# Patient Record
Sex: FEMALE | Race: WHITE | NOT HISPANIC OR LATINO | Employment: FULL TIME | URBAN - METROPOLITAN AREA
[De-identification: names, ages, dates, MRNs, and addresses within clinical notes are randomized per-mention and may not be internally consistent; named-entity substitution may affect disease eponyms.]

---

## 2022-06-17 PROBLEM — G56.03 BILATERAL CARPAL TUNNEL SYNDROME: Status: ACTIVE | Noted: 2020-02-14

## 2022-06-17 PROBLEM — Z87.81 HISTORY OF VERTEBRAL FRACTURE: Status: ACTIVE | Noted: 2017-02-13

## 2022-06-17 PROBLEM — M50.30 DEGENERATIVE DISC DISEASE, CERVICAL: Status: ACTIVE | Noted: 2019-11-14

## 2022-06-17 PROBLEM — M48.02 FORAMINAL STENOSIS OF CERVICAL REGION: Status: ACTIVE | Noted: 2020-02-14

## 2022-06-23 PROCEDURE — 88305 TISSUE EXAM BY PATHOLOGIST: CPT | Performed by: PATHOLOGY

## 2022-06-24 ENCOUNTER — LAB REQUISITION (OUTPATIENT)
Dept: LAB | Facility: HOSPITAL | Age: 57
End: 2022-06-24
Payer: COMMERCIAL

## 2022-06-24 DIAGNOSIS — Z12.11 ENCOUNTER FOR SCREENING FOR MALIGNANT NEOPLASM OF COLON: ICD-10-CM

## 2022-07-14 PROBLEM — K52.9 COLITIS, ACUTE: Status: ACTIVE | Noted: 2022-07-14

## 2022-10-21 PROBLEM — K21.9 GERD (GASTROESOPHAGEAL REFLUX DISEASE): Status: ACTIVE | Noted: 2022-10-21

## 2023-06-01 ENCOUNTER — APPOINTMENT (OUTPATIENT)
Age: 58
End: 2023-06-01
Payer: OTHER MISCELLANEOUS

## 2023-06-01 DIAGNOSIS — S39.012A STRAIN OF LUMBAR REGION, INITIAL ENCOUNTER: Primary | ICD-10-CM

## 2023-06-01 DIAGNOSIS — S39.012A STRAIN OF LUMBAR REGION, INITIAL ENCOUNTER: ICD-10-CM

## 2023-06-01 PROCEDURE — G0382 LEV 3 HOSP TYPE B ED VISIT: HCPCS | Performed by: PHYSICIAN ASSISTANT

## 2023-06-01 PROCEDURE — 99283 EMERGENCY DEPT VISIT LOW MDM: CPT | Performed by: PHYSICIAN ASSISTANT

## 2023-06-01 PROCEDURE — 72100 X-RAY EXAM L-S SPINE 2/3 VWS: CPT

## 2023-06-08 ENCOUNTER — APPOINTMENT (OUTPATIENT)
Age: 58
End: 2023-06-08
Payer: OTHER MISCELLANEOUS

## 2023-06-08 PROCEDURE — G0382 LEV 3 HOSP TYPE B ED VISIT: HCPCS

## 2023-06-08 PROCEDURE — 99283 EMERGENCY DEPT VISIT LOW MDM: CPT

## 2023-06-21 ENCOUNTER — APPOINTMENT (OUTPATIENT)
Age: 58
End: 2023-06-21
Payer: OTHER MISCELLANEOUS

## 2023-06-21 PROCEDURE — 99213 OFFICE O/P EST LOW 20 MIN: CPT

## 2023-07-06 ENCOUNTER — APPOINTMENT (OUTPATIENT)
Age: 58
End: 2023-07-06
Payer: OTHER MISCELLANEOUS

## 2023-07-06 PROCEDURE — 99213 OFFICE O/P EST LOW 20 MIN: CPT | Performed by: PHYSICIAN ASSISTANT

## 2023-07-07 ENCOUNTER — TELEPHONE (OUTPATIENT)
Dept: PAIN MEDICINE | Facility: MEDICAL CENTER | Age: 58
End: 2023-07-07

## 2023-07-07 NOTE — TELEPHONE ENCOUNTER
Caller: Tha Ontiveros NP    Doctor: Dr Autumn Thapa    Reason for call: Verify w/c claim and update the personal insurance with the pt     Date of accident  05/24/23  Body Part  Lower back   Ins [facility]   Claim  # [id number]  Tam Jose [phone] ext [phone]  AS[address]  [id number]  [place]  [id number]      Call back#: [phone] to update personal in

## 2023-07-11 NOTE — TELEPHONE ENCOUNTER
Left message with W/C Adjustor listed below to verify if claim is open/active for back pain. Attempted to reach patient to verify if she has secondary insurance, however only received busy tone when dialing.

## 2023-07-11 NOTE — TELEPHONE ENCOUNTER
Caller: Genny Tavera PT    Doctor: Dr Theodora Madera     Reason for call: Pt called in provide the secondary insurance and the chart has been updated     Call back#: N/A

## 2023-07-28 NOTE — TELEPHONE ENCOUNTER
Spoke with Casey Melvin. Explained to  that patient is currently established with [facility] for same issue, if she wishes to establish with [facility] she must leave their practice and have records faxed over.  stated that she can see/continue care at [address] [place] [address], it is not required for her to be seen at [facility] through [address] [place] [address] Drive will be reaching out to patient with this information.

## 2023-10-18 PROBLEM — R19.7 DIARRHEA: Status: ACTIVE | Noted: 2023-10-18

## 2024-02-12 PROCEDURE — 88305 TISSUE EXAM BY PATHOLOGIST: CPT | Performed by: STUDENT IN AN ORGANIZED HEALTH CARE EDUCATION/TRAINING PROGRAM

## 2024-02-13 ENCOUNTER — LAB REQUISITION (OUTPATIENT)
Dept: LAB | Facility: HOSPITAL | Age: 59
End: 2024-02-13
Payer: COMMERCIAL

## 2024-02-13 DIAGNOSIS — K52.89 OTHER SPECIFIED NONINFECTIVE GASTROENTERITIS AND COLITIS: ICD-10-CM

## 2024-02-27 PROBLEM — K50.10 CROHN'S COLITIS (HCC): Status: ACTIVE | Noted: 2022-07-14

## 2025-02-02 ENCOUNTER — OFFICE VISIT (OUTPATIENT)
Dept: URGENT CARE | Facility: MEDICAL CENTER | Age: 60
End: 2025-02-02
Payer: COMMERCIAL

## 2025-02-02 VITALS
SYSTOLIC BLOOD PRESSURE: 136 MMHG | OXYGEN SATURATION: 98 % | RESPIRATION RATE: 18 BRPM | TEMPERATURE: 97.4 F | HEART RATE: 115 BPM | DIASTOLIC BLOOD PRESSURE: 74 MMHG

## 2025-02-02 DIAGNOSIS — N61.0 CELLULITIS OF LEFT BREAST: Primary | ICD-10-CM

## 2025-02-02 PROCEDURE — G0382 LEV 3 HOSP TYPE B ED VISIT: HCPCS | Performed by: PHYSICIAN ASSISTANT

## 2025-02-02 RX ORDER — LOPERAMIDE HYDROCHLORIDE 2 MG/1
2 TABLET ORAL
COMMUNITY

## 2025-02-02 RX ORDER — CLONIDINE HYDROCHLORIDE 0.1 MG/1
0.05 TABLET ORAL 2 TIMES DAILY
COMMUNITY
Start: 2024-12-27

## 2025-02-02 RX ORDER — IBUPROFEN 600 MG/1
TABLET, FILM COATED ORAL
COMMUNITY

## 2025-02-02 RX ORDER — MONTELUKAST SODIUM 10 MG/1
10 TABLET ORAL
COMMUNITY
Start: 2025-01-17

## 2025-02-02 RX ORDER — LAMOTRIGINE 100 MG/1
1 TABLET ORAL DAILY
COMMUNITY
Start: 2024-12-10

## 2025-02-02 RX ORDER — FLUCONAZOLE 150 MG/1
TABLET ORAL
COMMUNITY
Start: 2024-12-16

## 2025-02-02 RX ORDER — DOXYCYCLINE 100 MG/1
100 TABLET ORAL 2 TIMES DAILY
Qty: 20 TABLET | Refills: 0 | Status: SHIPPED | OUTPATIENT
Start: 2025-02-02 | End: 2025-02-12

## 2025-02-02 RX ORDER — TRIAMCINOLONE ACETONIDE 1 MG/G
1 CREAM TOPICAL 2 TIMES DAILY
COMMUNITY
Start: 2024-12-24

## 2025-02-02 RX ORDER — HYDROXYZINE HYDROCHLORIDE 10 MG/1
TABLET, FILM COATED ORAL
COMMUNITY
Start: 2025-01-24

## 2025-02-02 RX ORDER — LORAZEPAM 0.5 MG/1
TABLET ORAL
COMMUNITY

## 2025-02-02 RX ORDER — BUDESONIDE 90 UG/1
2 AEROSOL, POWDER RESPIRATORY (INHALATION) 2 TIMES DAILY
COMMUNITY
Start: 2024-12-13

## 2025-02-03 NOTE — PROGRESS NOTES
[facility]        NAME: Brisa Nick is a 59 y.o. female  : 1965    MRN: [id number]  DATE: 2025  TIME: 7:37 PM    Assessment and Plan   Cellulitis of left breast [N61.0]  1. Cellulitis of left breast  doxycycline (ADOXA) 100 MG tablet            Patient Instructions       Follow up with PCP in 7 to 10 days.  Or sooner if there is a problem.  Finish antibiotic.  Do not squeeze the area.  Soap and water.  Explained to the patient that there is no way to culture the area as there is no drainage.    If tests have been performed at [facility], our office will contact you with results if changes need to be made to the care plan discussed with you at the visit.  [facility]    Chief Complaint     Chief Complaint   Patient presents with    Breast Mass     Patient c/o a lump on her left breat x 3 days          History of Present Illness       Patient over the last 3 days has had a area on her left breast at 5:00 below the nipple that is erythematous and slightly scabbed over.  She states that it has gotten bigger since she squeezed it and got some liquid out of it.  There has been no drainage since then but the redness has increased.  No fever or chills.  Patient feels she has MRSA even though there is no prior history of this.        Review of Systems   Review of Systems   All other systems reviewed and are negative.        Current Medications       Current Outpatient Medications:     cloNIDine (CATAPRES) 0.1 mg tablet, Take 0.05 mg by mouth 2 (two) times a day, Disp: , Rfl:     doxycycline (ADOXA) 100 MG tablet, Take 1 tablet (100 mg total) by mouth 2 (two) times a day for 10 days, Disp: 20 tablet, Rfl: 0    fluconazole (DIFLUCAN) 150 mg tablet, TAKE 1 TABLET BY MOUTH NOW AND REPEAT DOSE IN 3 DAYS, Disp: , Rfl:     hydrOXYzine HCL (ATARAX) 10 mg tablet, TAKE BY MOUTH UP TO 2 TABLETS NIGHTLY AS NEEDED FOR SLEEP, Disp: , Rfl:     lamoTRIgine (LaMICtal)  100 mg tablet, Take 1 tablet by mouth in the morning, Disp: , Rfl:     montelukast (SINGULAIR) 10 mg tablet, Take 10 mg by mouth, Disp: , Rfl:     NALOXONE HCL NA, 4 mg into each nostril, Disp: , Rfl:     Pulmicort Flexhaler 90 MCG/ACT inhaler, Inhale 2 puffs 2 (two) times a day, Disp: , Rfl:     triamcinolone (KENALOG) 0.1 % cream, Apply 1 application. topically 2 (two) times a day, Disp: , Rfl:     albuterol (PROVENTIL HFA,VENTOLIN HFA) 90 mcg/act inhaler, Inhale 2 puffs if needed, Disp: , Rfl:     ARIPiprazole (ABILIFY) 5 mg tablet, Take 5 mg by mouth in the morning (Patient not taking: Reported on 1/23/2025), Disp: , Rfl:     buprenorphine (BUTRANS) 15 mcg/hr, Place 1 patch on the skin Once a week, Disp: , Rfl:     cholecalciferol (VITAMIN D3) 1,000 units tablet, Take 2,000 Units by mouth daily, Disp: , Rfl:     dicyclomine (BENTYL) 20 mg tablet, Take 20 mg by mouth 3 (three) times a day (Patient not taking: Reported on 1/23/2025), Disp: , Rfl:     diphenoxylate-atropine (LOMOTIL) 2.5-0.025 mg per tablet, Take 1 tablet by mouth 4 (four) times a day as needed for diarrhea, Disp: 100 tablet, Rfl: 2    HYDROcodone-acetaminophen (NORCO) 5-325 mg per tablet, Take 1-2 tablets by mouth every 8 (eight) hours as needed, Disp: , Rfl:     hyoscyamine (LEVSIN/SL) 0.125 mg SL tablet, Take 1 tablet (0.125 mg total) by mouth every 6 (six) hours as needed for cramping or diarrhea, Disp: 60 tablet, Rfl: 2    ibuprofen (MOTRIN) 600 mg tablet, Take by mouth, Disp: , Rfl:     lamoTRIgine (LaMICtal) 50 MG TBDP, Take 50 mg by mouth 2 (two) times a day, Disp: , Rfl:     loperamide (IMODIUM A-D) 2 MG tablet, Take 2 mg by mouth, Disp: , Rfl:     LORazepam (Ativan) 0.5 mg tablet, , Disp: , Rfl:     NON FORMULARY, Formulation: Dispensary Name: Dispensary Phone Number:, Disp: , Rfl:     omeprazole (PriLOSEC) 40 MG capsule, Take 40 mg by mouth daily, Disp: , Rfl:     Potassium 99 MG TABS, Take by mouth daily, Disp: , Rfl:     sodium  picosulfate, magnesium oxide, citric acid (Clenpiq) oral solution, Take 175 mL by mouth see administration instructions Follow instructions given at office, Disp: 350 mL, Rfl: 0    Upadacitinib ER (Rinvoq) 15 MG TB24, Take 1 tablet by mouth in the morning (Patient not taking: Reported on 1/23/2025), Disp: 90 tablet, Rfl: 3    Current Allergies     Allergies as of 02/02/2025 - Reviewed 01/23/2025   Allergen Reaction Noted    Gabapentin Other (See Comments) 02/22/2017    Amoxicillin Hives 01/09/2025    Banana - food allergy Other (See Comments) 02/02/2025    Banana extract allergy skin test - food allergy Other (See Comments) 02/02/2025    Cefadroxil Other (See Comments) 06/20/2022    Cephalexin Other (See Comments) 03/16/2015    Cephalosporins Other (See Comments) 02/28/2003    Charentais melon (Bulgarian melon) Other (See Comments) 02/02/2025    Latex Hives and Itching 02/27/2024    Lithium Other (See Comments) 06/20/2022    Misoprostol Other (See Comments) and Diarrhea 06/07/2018    Nuts - food allergy Other (See Comments) 02/02/2025    Other Other (See Comments) 07/28/2011    Quetiapine Other (See Comments) 03/16/2015    Sulfasalazine Other (See Comments) 03/12/2024    Silver Rash 02/27/2024    Wound dressing adhesive Rash 02/27/2024            The following portions of the patient's history were reviewed and updated as appropriate: allergies, current medications, past family history, past medical history, past social history, past surgical history and problem list.     Past Medical History:   Diagnosis Date    Bilateral carpal tunnel syndrome 2/14/2020    Degenerative disc disease, cervical 11/14/2019    Fibromyalgia 2/9/2012    Last Assessment & Plan:  Formatting of this note might be different from the original. 1.  As we discussed, given that your fibromyalgia is so symptomatic, treatment with a medication is reasonable in addition to the usual modalities of regular exercise, stretching and sleep hygiene.  The 3  medications approved for fibromyalgia or duloxetine, Lyrica and Savella.  My choice would be duloxetine.  Yo    Foraminal stenosis of cervical region 2/14/2020    History of vertebral fracture 2/13/2017    Migraine headache 5/2/2015    Last Assessment & Plan:  Formatting of this note might be different from the original. 1.  To reduce frequency of migraines, please follow a regular daily schedule going to bed and awakening at the same time.  Do not skip meals.  Maintain good hydration, preferably with water, throughout the day.  Avoid those foods/beverages/situations which you know can bring on migraines. 2.  Take turmeric (appr    Mild intermittent asthma without complication 2/9/2012    Formatting of this note might be different from the original. intermittent  Last Assessment & Plan:  Formatting of this note might be different from the original. 1.  Your lung exam is entirely clear during this visit.  You informs me that you had used your nebulizer before coming to the office. 2.  Continue using the nebulizer with the albuterol solution up to 4 times a day as needed. 3.  It goes    Mood disorder (HCC) 10/5/2015    Posttraumatic stress disorder 5/5/2014    Raynaud's phenomenon 9/18/2014    Vitamin D deficiency 4/18/2014    Last Assessment & Plan:  Formatting of this note might be different from the original. 1.  It is not clear why you are having a low level of vitamin D deficiency despite taking a supplement.  One possibility is an intestinal disorder such as celiac disease. 2.  Continue with the higher dose of vitamin D 5000 units daily for at least one month.  One month from now, stop the biotin for at least 1 we       Past Surgical History:   Procedure Laterality Date    COLONOSCOPY      Age 48 for screening, normal per patient. Details unknown.    COLONOSCOPY W/ BIOPSIES  06/2022    dr crawford; mild colitis etiology unclear.    COLONOSCOPY W/ BIOPSIES  02/2024    dr crawford; patch colitis with small ulcers;  diverticulosis    [facility]; normal stomach; normal duodenum; 3cm hiatal hernia.       Family History   Problem Relation Age of Onset    Kidney cancer Sister     Colon polyps Sister     Colon cancer Paternal Grandmother     Colon cancer Paternal Aunt     Colon cancer Paternal Uncle          Medications have been verified.        Objective   /74   Pulse (!) 115   Temp (!) 97.4 °F (36.3 °C)   Resp 18   SpO2 98%   No LMP recorded.       Physical Exam     Physical Exam  Vitals and nursing note reviewed.   Constitutional:       Appearance: Normal appearance. She is normal weight.   Cardiovascular:      Rate and Rhythm: Regular rhythm. Tachycardia present.      Pulses: Normal pulses.      Heart sounds: Normal heart sounds.   Pulmonary:      Effort: Pulmonary effort is normal.      Breath sounds: Normal breath sounds.   Neurological:      Mental Status: She is alert.

## 2025-02-04 ENCOUNTER — APPOINTMENT (OUTPATIENT)
Age: 60
End: 2025-02-04
Payer: COMMERCIAL

## 2025-02-07 ENCOUNTER — APPOINTMENT (OUTPATIENT)
Age: 60
End: 2025-02-07
Payer: COMMERCIAL

## 2025-02-07 ENCOUNTER — APPOINTMENT (OUTPATIENT)
Dept: LAB | Facility: CLINIC | Age: 60
End: 2025-02-07
Payer: COMMERCIAL

## 2025-02-07 DIAGNOSIS — R19.7 DIARRHEA, UNSPECIFIED TYPE: ICD-10-CM

## 2025-02-07 DIAGNOSIS — E78.1 HYPERTRIGLYCERIDEMIA: ICD-10-CM

## 2025-02-07 DIAGNOSIS — K50.119 CROHN'S DISEASE OF COLON WITH COMPLICATION (HCC): ICD-10-CM

## 2025-02-07 DIAGNOSIS — K50.919 CROHN'S DISEASE WITH COMPLICATION, UNSPECIFIED GASTROINTESTINAL TRACT LOCATION (HCC): ICD-10-CM

## 2025-02-07 LAB
ALBUMIN SERPL BCG-MCNC: 4.4 G/DL (ref 3.5–5)
ALP SERPL-CCNC: 60 U/L (ref 34–104)
ALT SERPL W P-5'-P-CCNC: 14 U/L (ref 7–52)
ANION GAP SERPL CALCULATED.3IONS-SCNC: 10 MMOL/L (ref 4–13)
AST SERPL W P-5'-P-CCNC: 14 U/L (ref 13–39)
BASOPHILS # BLD AUTO: 0.06 THOUSANDS/ΜL (ref 0–0.1)
BASOPHILS NFR BLD AUTO: 1 % (ref 0–1)
BILIRUB SERPL-MCNC: 0.51 MG/DL (ref 0.2–1)
BUN SERPL-MCNC: 19 MG/DL (ref 5–25)
CALCIUM SERPL-MCNC: 9.5 MG/DL (ref 8.4–10.2)
CHLORIDE SERPL-SCNC: 100 MMOL/L (ref 96–108)
CHOLEST SERPL-MCNC: 194 MG/DL (ref ?–200)
CO2 SERPL-SCNC: 26 MMOL/L (ref 21–32)
CREAT SERPL-MCNC: 0.67 MG/DL (ref 0.6–1.3)
CRP SERPL QL: 16.8 MG/L
EOSINOPHIL # BLD AUTO: 0.28 THOUSAND/ΜL (ref 0–0.61)
EOSINOPHIL NFR BLD AUTO: 3 % (ref 0–6)
ERYTHROCYTE [DISTWIDTH] IN BLOOD BY AUTOMATED COUNT: 12.8 % (ref 11.6–15.1)
ERYTHROCYTE [SEDIMENTATION RATE] IN BLOOD: 40 MM/HOUR (ref 0–29)
GFR SERPL CREATININE-BSD FRML MDRD: 95 ML/MIN/1.73SQ M
GLUCOSE P FAST SERPL-MCNC: 106 MG/DL (ref 65–99)
HCT VFR BLD AUTO: 44 % (ref 34.8–46.1)
HDLC SERPL-MCNC: 45 MG/DL
HGB BLD-MCNC: 14.5 G/DL (ref 11.5–15.4)
IMM GRANULOCYTES # BLD AUTO: 0.07 THOUSAND/UL (ref 0–0.2)
IMM GRANULOCYTES NFR BLD AUTO: 1 % (ref 0–2)
LDLC SERPL CALC-MCNC: 127 MG/DL (ref 0–100)
LYMPHOCYTES # BLD AUTO: 2.16 THOUSANDS/ΜL (ref 0.6–4.47)
LYMPHOCYTES NFR BLD AUTO: 19 % (ref 14–44)
MCH RBC QN AUTO: 29.3 PG (ref 26.8–34.3)
MCHC RBC AUTO-ENTMCNC: 33 G/DL (ref 31.4–37.4)
MCV RBC AUTO: 89 FL (ref 82–98)
MONOCYTES # BLD AUTO: 0.69 THOUSAND/ΜL (ref 0.17–1.22)
MONOCYTES NFR BLD AUTO: 6 % (ref 4–12)
NEUTROPHILS # BLD AUTO: 7.94 THOUSANDS/ΜL (ref 1.85–7.62)
NEUTS SEG NFR BLD AUTO: 70 % (ref 43–75)
NONHDLC SERPL-MCNC: 149 MG/DL
NRBC BLD AUTO-RTO: 0 /100 WBCS
PLATELET # BLD AUTO: 332 THOUSANDS/UL (ref 149–390)
PMV BLD AUTO: 10.5 FL (ref 8.9–12.7)
POTASSIUM SERPL-SCNC: 4.1 MMOL/L (ref 3.5–5.3)
PROT SERPL-MCNC: 6.9 G/DL (ref 6.4–8.4)
RBC # BLD AUTO: 4.95 MILLION/UL (ref 3.81–5.12)
SODIUM SERPL-SCNC: 136 MMOL/L (ref 135–147)
TRIGL SERPL-MCNC: 112 MG/DL (ref ?–150)
WBC # BLD AUTO: 11.2 THOUSAND/UL (ref 4.31–10.16)

## 2025-02-07 PROCEDURE — 80053 COMPREHEN METABOLIC PANEL: CPT

## 2025-02-07 PROCEDURE — 86706 HEP B SURFACE ANTIBODY: CPT

## 2025-02-07 PROCEDURE — 86480 TB TEST CELL IMMUN MEASURE: CPT

## 2025-02-07 PROCEDURE — 85025 COMPLETE CBC W/AUTO DIFF WBC: CPT

## 2025-02-07 PROCEDURE — 36415 COLL VENOUS BLD VENIPUNCTURE: CPT

## 2025-02-07 PROCEDURE — 83993 ASSAY FOR CALPROTECTIN FECAL: CPT

## 2025-02-07 PROCEDURE — 87340 HEPATITIS B SURFACE AG IA: CPT

## 2025-02-07 PROCEDURE — 86140 C-REACTIVE PROTEIN: CPT

## 2025-02-07 PROCEDURE — 85652 RBC SED RATE AUTOMATED: CPT

## 2025-02-07 PROCEDURE — 80061 LIPID PANEL: CPT

## 2025-02-08 LAB
GAMMA INTERFERON BACKGROUND BLD IA-ACNC: <0 IU/ML
HBV SURFACE AB SER-ACNC: 7.15 MIU/ML
HBV SURFACE AG SER QL: NORMAL
M TB IFN-G BLD-IMP: NEGATIVE
M TB IFN-G CD4+ BCKGRND COR BLD-ACNC: 0 IU/ML
M TB IFN-G CD4+ BCKGRND COR BLD-ACNC: 0 IU/ML
MITOGEN IGNF BCKGRD COR BLD-ACNC: 10 IU/ML

## 2025-02-10 LAB — CALPROTECTIN STL-MCNC: 198 ΜG/G

## 2025-02-14 PROCEDURE — 88305 TISSUE EXAM BY PATHOLOGIST: CPT | Performed by: PATHOLOGY

## 2025-02-17 ENCOUNTER — LAB REQUISITION (OUTPATIENT)
Dept: LAB | Facility: HOSPITAL | Age: 60
End: 2025-02-17
Payer: COMMERCIAL

## 2025-02-17 DIAGNOSIS — K50.90 CROHN'S DISEASE, UNSPECIFIED, WITHOUT COMPLICATIONS (HCC): ICD-10-CM

## 2025-02-17 DIAGNOSIS — K52.89 OTHER SPECIFIED NONINFECTIVE GASTROENTERITIS AND COLITIS: ICD-10-CM

## 2025-03-02 ENCOUNTER — HOSPITAL ENCOUNTER (EMERGENCY)
Facility: HOSPITAL | Age: 60
End: 2025-03-03
Attending: EMERGENCY MEDICINE
Payer: COMMERCIAL

## 2025-03-02 DIAGNOSIS — K50.119 CROHN'S DISEASE OF COLON WITH COMPLICATION (HCC): ICD-10-CM

## 2025-03-02 DIAGNOSIS — F31.9 BIPOLAR DISORDER (HCC): ICD-10-CM

## 2025-03-02 DIAGNOSIS — Z00.8 ENCOUNTER FOR PSYCHOLOGICAL EVALUATION: Primary | ICD-10-CM

## 2025-03-02 LAB
ALBUMIN SERPL BCG-MCNC: 4.9 G/DL (ref 3.5–5)
ALP SERPL-CCNC: 63 U/L (ref 34–104)
ALT SERPL W P-5'-P-CCNC: 20 U/L (ref 7–52)
AMPHETAMINES SERPL QL SCN: NEGATIVE
ANION GAP SERPL CALCULATED.3IONS-SCNC: 6 MMOL/L (ref 4–13)
APAP SERPL-MCNC: <2 UG/ML (ref 10–20)
AST SERPL W P-5'-P-CCNC: 20 U/L (ref 13–39)
ATRIAL RATE: 88 BPM
BACTERIA UR QL AUTO: ABNORMAL /HPF
BARBITURATES UR QL: NEGATIVE
BASOPHILS # BLD AUTO: 0.05 THOUSANDS/ÂΜL (ref 0–0.1)
BASOPHILS NFR BLD AUTO: 0 % (ref 0–1)
BENZODIAZ UR QL: NEGATIVE
BILIRUB SERPL-MCNC: 0.44 MG/DL (ref 0.2–1)
BILIRUB UR QL STRIP: NEGATIVE
BUN SERPL-MCNC: 15 MG/DL (ref 5–25)
CALCIUM SERPL-MCNC: 9.7 MG/DL (ref 8.4–10.2)
CHLORIDE SERPL-SCNC: 105 MMOL/L (ref 96–108)
CLARITY UR: CLEAR
CO2 SERPL-SCNC: 30 MMOL/L (ref 21–32)
COCAINE UR QL: NEGATIVE
COLOR UR: ABNORMAL
CREAT SERPL-MCNC: 0.66 MG/DL (ref 0.6–1.3)
EOSINOPHIL # BLD AUTO: 0.04 THOUSAND/ÂΜL (ref 0–0.61)
EOSINOPHIL NFR BLD AUTO: 0 % (ref 0–6)
ERYTHROCYTE [DISTWIDTH] IN BLOOD BY AUTOMATED COUNT: 12.9 % (ref 11.6–15.1)
ETHANOL SERPL-MCNC: <10 MG/DL
FENTANYL UR QL SCN: NEGATIVE
GFR SERPL CREATININE-BSD FRML MDRD: 95 ML/MIN/1.73SQ M
GLUCOSE SERPL-MCNC: 114 MG/DL (ref 65–140)
GLUCOSE UR STRIP-MCNC: NEGATIVE MG/DL
HCT VFR BLD AUTO: 45.7 % (ref 34.8–46.1)
HGB BLD-MCNC: 15.2 G/DL (ref 11.5–15.4)
HGB UR QL STRIP.AUTO: ABNORMAL
HYDROCODONE UR QL SCN: NEGATIVE
IMM GRANULOCYTES # BLD AUTO: 0.05 THOUSAND/UL (ref 0–0.2)
IMM GRANULOCYTES NFR BLD AUTO: 0 % (ref 0–2)
KETONES UR STRIP-MCNC: NEGATIVE MG/DL
LEUKOCYTE ESTERASE UR QL STRIP: NEGATIVE
LYMPHOCYTES # BLD AUTO: 1.79 THOUSANDS/ÂΜL (ref 0.6–4.47)
LYMPHOCYTES NFR BLD AUTO: 15 % (ref 14–44)
MCH RBC QN AUTO: 28.7 PG (ref 26.8–34.3)
MCHC RBC AUTO-ENTMCNC: 33.3 G/DL (ref 31.4–37.4)
MCV RBC AUTO: 86 FL (ref 82–98)
METHADONE UR QL: NEGATIVE
MONOCYTES # BLD AUTO: 0.66 THOUSAND/ÂΜL (ref 0.17–1.22)
MONOCYTES NFR BLD AUTO: 6 % (ref 4–12)
MUCOUS THREADS UR QL AUTO: ABNORMAL
NEUTROPHILS # BLD AUTO: 9.47 THOUSANDS/ÂΜL (ref 1.85–7.62)
NEUTS SEG NFR BLD AUTO: 79 % (ref 43–75)
NITRITE UR QL STRIP: NEGATIVE
NON-SQ EPI CELLS URNS QL MICRO: ABNORMAL /HPF
NRBC BLD AUTO-RTO: 0 /100 WBCS
OPIATES UR QL SCN: NEGATIVE
OXYCODONE+OXYMORPHONE UR QL SCN: NEGATIVE
P AXIS: 73 DEGREES
PCP UR QL: NEGATIVE
PH UR STRIP.AUTO: 5.5 [PH]
PLATELET # BLD AUTO: 325 THOUSANDS/UL (ref 149–390)
PMV BLD AUTO: 9.9 FL (ref 8.9–12.7)
POTASSIUM SERPL-SCNC: 4.1 MMOL/L (ref 3.5–5.3)
PR INTERVAL: 142 MS
PROT SERPL-MCNC: 7.2 G/DL (ref 6.4–8.4)
PROT UR STRIP-MCNC: NEGATIVE MG/DL
QRS AXIS: 72 DEGREES
QRSD INTERVAL: 80 MS
QT INTERVAL: 360 MS
QTC INTERVAL: 435 MS
RBC # BLD AUTO: 5.29 MILLION/UL (ref 3.81–5.12)
RBC #/AREA URNS AUTO: ABNORMAL /HPF
SALICYLATES SERPL-MCNC: <5 MG/DL (ref 3–20)
SODIUM SERPL-SCNC: 141 MMOL/L (ref 135–147)
SP GR UR STRIP.AUTO: 1.01 (ref 1–1.03)
T WAVE AXIS: 77 DEGREES
T4 FREE SERPL-MCNC: 0.97 NG/DL (ref 0.61–1.12)
THC UR QL: NEGATIVE
TSH SERPL DL<=0.05 MIU/L-ACNC: 0.33 UIU/ML (ref 0.45–4.5)
UROBILINOGEN UR STRIP-ACNC: <2 MG/DL
VENTRICULAR RATE: 88 BPM
WBC # BLD AUTO: 12.06 THOUSAND/UL (ref 4.31–10.16)
WBC #/AREA URNS AUTO: ABNORMAL /HPF

## 2025-03-02 PROCEDURE — 80307 DRUG TEST PRSMV CHEM ANLYZR: CPT | Performed by: PHYSICIAN ASSISTANT

## 2025-03-02 PROCEDURE — 93005 ELECTROCARDIOGRAM TRACING: CPT

## 2025-03-02 PROCEDURE — 99285 EMERGENCY DEPT VISIT HI MDM: CPT | Performed by: PHYSICIAN ASSISTANT

## 2025-03-02 PROCEDURE — 82077 ASSAY SPEC XCP UR&BREATH IA: CPT | Performed by: PHYSICIAN ASSISTANT

## 2025-03-02 PROCEDURE — 84439 ASSAY OF FREE THYROXINE: CPT | Performed by: PHYSICIAN ASSISTANT

## 2025-03-02 PROCEDURE — 85025 COMPLETE CBC W/AUTO DIFF WBC: CPT | Performed by: PHYSICIAN ASSISTANT

## 2025-03-02 PROCEDURE — 80053 COMPREHEN METABOLIC PANEL: CPT | Performed by: PHYSICIAN ASSISTANT

## 2025-03-02 PROCEDURE — 80179 DRUG ASSAY SALICYLATE: CPT | Performed by: PHYSICIAN ASSISTANT

## 2025-03-02 PROCEDURE — 84443 ASSAY THYROID STIM HORMONE: CPT | Performed by: PHYSICIAN ASSISTANT

## 2025-03-02 PROCEDURE — 93010 ELECTROCARDIOGRAM REPORT: CPT | Performed by: INTERNAL MEDICINE

## 2025-03-02 PROCEDURE — 36415 COLL VENOUS BLD VENIPUNCTURE: CPT | Performed by: PHYSICIAN ASSISTANT

## 2025-03-02 PROCEDURE — 80143 DRUG ASSAY ACETAMINOPHEN: CPT | Performed by: PHYSICIAN ASSISTANT

## 2025-03-02 PROCEDURE — 99284 EMERGENCY DEPT VISIT MOD MDM: CPT

## 2025-03-02 PROCEDURE — 81001 URINALYSIS AUTO W/SCOPE: CPT | Performed by: PHYSICIAN ASSISTANT

## 2025-03-02 RX ORDER — LAMOTRIGINE 100 MG/1
100 TABLET ORAL DAILY
Status: CANCELLED | OUTPATIENT
Start: 2025-03-03

## 2025-03-02 RX ORDER — RISPERIDONE 1 MG/1
0.5 TABLET ORAL
Status: CANCELLED | OUTPATIENT
Start: 2025-03-02

## 2025-03-02 RX ORDER — ACETAMINOPHEN 325 MG/1
975 TABLET ORAL EVERY 6 HOURS PRN
Status: CANCELLED | OUTPATIENT
Start: 2025-03-02

## 2025-03-02 RX ORDER — LORAZEPAM 0.5 MG/1
0.5 TABLET ORAL ONCE
Status: COMPLETED | OUTPATIENT
Start: 2025-03-02 | End: 2025-03-02

## 2025-03-02 RX ORDER — HYDROXYZINE HYDROCHLORIDE 25 MG/1
25 TABLET, FILM COATED ORAL
Status: CANCELLED | OUTPATIENT
Start: 2025-03-02

## 2025-03-02 RX ORDER — ACETAMINOPHEN 325 MG/1
650 TABLET ORAL EVERY 4 HOURS PRN
Status: CANCELLED | OUTPATIENT
Start: 2025-03-02

## 2025-03-02 RX ORDER — HYDROXYZINE HYDROCHLORIDE 25 MG/1
50 TABLET, FILM COATED ORAL
Status: CANCELLED | OUTPATIENT
Start: 2025-03-02

## 2025-03-02 RX ORDER — RISPERIDONE 1 MG/1
1 TABLET ORAL
Status: CANCELLED | OUTPATIENT
Start: 2025-03-02

## 2025-03-02 RX ORDER — LAMOTRIGINE 25 MG/1
50 TABLET ORAL 2 TIMES DAILY
Status: CANCELLED | OUTPATIENT
Start: 2025-03-02

## 2025-03-02 RX ORDER — PROPRANOLOL HYDROCHLORIDE 10 MG/1
5 TABLET ORAL EVERY 8 HOURS PRN
Status: CANCELLED | OUTPATIENT
Start: 2025-03-02

## 2025-03-02 RX ORDER — TRAZODONE HYDROCHLORIDE 50 MG/1
50 TABLET ORAL
Status: CANCELLED | OUTPATIENT
Start: 2025-03-02

## 2025-03-02 RX ORDER — HALOPERIDOL 5 MG/ML
5 INJECTION INTRAMUSCULAR
Status: CANCELLED | OUTPATIENT
Start: 2025-03-02

## 2025-03-02 RX ORDER — RISPERIDONE 0.25 MG/1
0.25 TABLET ORAL
Status: CANCELLED | OUTPATIENT
Start: 2025-03-02

## 2025-03-02 RX ORDER — MAGNESIUM HYDROXIDE/ALUMINUM HYDROXICE/SIMETHICONE 120; 1200; 1200 MG/30ML; MG/30ML; MG/30ML
30 SUSPENSION ORAL EVERY 4 HOURS PRN
Status: CANCELLED | OUTPATIENT
Start: 2025-03-02

## 2025-03-02 RX ADMIN — LORAZEPAM 0.5 MG: 0.5 TABLET ORAL at 21:20
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
## 2025-03-02 NOTE — ED NOTES
Daughter called and is wanting to petition a 302. Meeta at  on her way to ED at this time.      Orin Hay RN  03/02/25 4063
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
## 2025-03-02 NOTE — ED NOTES
Patient's daughter called the Ed, states that she feels mother is not safe at home, and would like to petition a 302 (as patient refused to stay in the hospital)  Daughter Meeta, PH; [phone]  Contacted  Crisis to notify them of impending 302

## 2025-03-03 ENCOUNTER — HOSPITAL ENCOUNTER (INPATIENT)
Facility: HOSPITAL | Age: 60
LOS: 15 days | Discharge: HOME | DRG: 885 | End: 2025-03-18
Attending: PSYCHIATRY & NEUROLOGY | Admitting: PSYCHIATRY & NEUROLOGY
Payer: COMMERCIAL

## 2025-03-03 VITALS
OXYGEN SATURATION: 98 % | HEIGHT: 65 IN | TEMPERATURE: 98.3 F | HEART RATE: 86 BPM | BODY MASS INDEX: 30.62 KG/M2 | SYSTOLIC BLOOD PRESSURE: 127 MMHG | RESPIRATION RATE: 16 BRPM | DIASTOLIC BLOOD PRESSURE: 57 MMHG

## 2025-03-03 DIAGNOSIS — E53.8 VITAMIN B12 DEFICIENCY: ICD-10-CM

## 2025-03-03 DIAGNOSIS — K05.10 GINGIVITIS: ICD-10-CM

## 2025-03-03 DIAGNOSIS — R10.9 ABDOMINAL SPASMS: ICD-10-CM

## 2025-03-03 DIAGNOSIS — H04.123 DRY EYES: ICD-10-CM

## 2025-03-03 DIAGNOSIS — K21.9 GASTROESOPHAGEAL REFLUX DISEASE, UNSPECIFIED WHETHER ESOPHAGITIS PRESENT: ICD-10-CM

## 2025-03-03 DIAGNOSIS — K50.119 CROHN'S DISEASE OF COLON WITH COMPLICATION (HCC): ICD-10-CM

## 2025-03-03 DIAGNOSIS — F31.5 BIPOLAR AFFECTIVE DISORDER, DEPRESSED, SEVERE, WITH PSYCHOTIC BEHAVIOR (HCC): Primary | ICD-10-CM

## 2025-03-03 DIAGNOSIS — F17.200 NICOTINE ADDICTION: ICD-10-CM

## 2025-03-03 DIAGNOSIS — J45.909 ASTHMA: ICD-10-CM

## 2025-03-03 DIAGNOSIS — G89.29 CHRONIC PAIN: ICD-10-CM

## 2025-03-03 DIAGNOSIS — L85.3 DRY SKIN: ICD-10-CM

## 2025-03-03 DIAGNOSIS — E55.9 VITAMIN D DEFICIENCY: ICD-10-CM

## 2025-03-03 DIAGNOSIS — F10.10 ALCOHOL ABUSE: ICD-10-CM

## 2025-03-03 DIAGNOSIS — J30.9 ALLERGIC RHINITIS: ICD-10-CM

## 2025-03-03 DIAGNOSIS — K13.79 MOUTH SORES: ICD-10-CM

## 2025-03-03 DIAGNOSIS — L60.2 OVERGROWN TOENAILS: ICD-10-CM

## 2025-03-03 PROBLEM — F33.3 MDD (MAJOR DEPRESSIVE DISORDER), RECURRENT, SEVERE, WITH PSYCHOSIS (HCC): Status: ACTIVE | Noted: 2025-03-03

## 2025-03-03 PROCEDURE — 99223 1ST HOSP IP/OBS HIGH 75: CPT | Performed by: PSYCHIATRY & NEUROLOGY

## 2025-03-03 RX ORDER — HALOPERIDOL 5 MG/ML
5 INJECTION INTRAMUSCULAR
Status: DISCONTINUED | OUTPATIENT
Start: 2025-03-03 | End: 2025-03-18 | Stop reason: HOSPADM

## 2025-03-03 RX ORDER — CRANBERRY FRUIT EXTRACT 200 MG
600 CAPSULE ORAL DAILY
COMMUNITY
End: 2025-03-18

## 2025-03-03 RX ORDER — LAMOTRIGINE 25 MG/1
50 TABLET ORAL 2 TIMES DAILY
Status: DISCONTINUED | OUTPATIENT
Start: 2025-03-03 | End: 2025-03-06

## 2025-03-03 RX ORDER — LAMOTRIGINE 100 MG/1
100 TABLET ORAL DAILY
Status: DISCONTINUED | OUTPATIENT
Start: 2025-03-03 | End: 2025-03-03

## 2025-03-03 RX ORDER — LANOLIN ALCOHOL/MO/W.PET/CERES
100 CREAM (GRAM) TOPICAL DAILY
Status: DISCONTINUED | OUTPATIENT
Start: 2025-03-03 | End: 2025-03-18 | Stop reason: HOSPADM

## 2025-03-03 RX ORDER — ACETAMINOPHEN 325 MG/1
650 TABLET ORAL EVERY 4 HOURS PRN
Status: DISCONTINUED | OUTPATIENT
Start: 2025-03-03 | End: 2025-03-18 | Stop reason: HOSPADM

## 2025-03-03 RX ORDER — FOLIC ACID 1 MG/1
1 TABLET ORAL DAILY
Status: DISCONTINUED | OUTPATIENT
Start: 2025-03-03 | End: 2025-03-18 | Stop reason: HOSPADM

## 2025-03-03 RX ORDER — RISPERIDONE 1 MG/1
1 TABLET ORAL
Status: DISCONTINUED | OUTPATIENT
Start: 2025-03-03 | End: 2025-03-18 | Stop reason: HOSPADM

## 2025-03-03 RX ORDER — TRAZODONE HYDROCHLORIDE 50 MG/1
50 TABLET ORAL
Status: DISCONTINUED | OUTPATIENT
Start: 2025-03-03 | End: 2025-03-18 | Stop reason: HOSPADM

## 2025-03-03 RX ORDER — HYDROCODONE BITARTRATE AND ACETAMINOPHEN 5; 325 MG/1; MG/1
2 TABLET ORAL EVERY 6 HOURS PRN
COMMUNITY
End: 2025-03-18

## 2025-03-03 RX ORDER — RISPERIDONE 1 MG/1
1 TABLET ORAL
Status: DISCONTINUED | OUTPATIENT
Start: 2025-03-03 | End: 2025-03-03

## 2025-03-03 RX ORDER — HYDROXYZINE HYDROCHLORIDE 25 MG/1
25 TABLET, FILM COATED ORAL
Status: DISCONTINUED | OUTPATIENT
Start: 2025-03-03 | End: 2025-03-18 | Stop reason: HOSPADM

## 2025-03-03 RX ORDER — OLANZAPINE 5 MG/1
5 TABLET, ORALLY DISINTEGRATING ORAL 3 TIMES DAILY PRN
Status: DISCONTINUED | OUTPATIENT
Start: 2025-03-03 | End: 2025-03-18 | Stop reason: HOSPADM

## 2025-03-03 RX ORDER — CYCLOSPORINE 0.5 MG/ML
1 EMULSION OPHTHALMIC EVERY 12 HOURS
Status: ON HOLD | COMMUNITY
End: 2025-03-15

## 2025-03-03 RX ORDER — BUPRENORPHINE 15 UG/H
1 PATCH TRANSDERMAL
Status: ON HOLD | COMMUNITY
End: 2025-03-15

## 2025-03-03 RX ORDER — TRAZODONE HYDROCHLORIDE 50 MG/1
50 TABLET ORAL
Status: DISCONTINUED | OUTPATIENT
Start: 2025-03-03 | End: 2025-03-03

## 2025-03-03 RX ORDER — PROPRANOLOL HYDROCHLORIDE 10 MG/1
5 TABLET ORAL EVERY 8 HOURS PRN
Status: DISCONTINUED | OUTPATIENT
Start: 2025-03-03 | End: 2025-03-18 | Stop reason: HOSPADM

## 2025-03-03 RX ORDER — RISPERIDONE 0.5 MG/1
0.5 TABLET ORAL
Status: DISCONTINUED | OUTPATIENT
Start: 2025-03-03 | End: 2025-03-03

## 2025-03-03 RX ORDER — HYDROXYZINE HYDROCHLORIDE 25 MG/1
25 TABLET, FILM COATED ORAL ONCE
Status: COMPLETED | OUTPATIENT
Start: 2025-03-03 | End: 2025-03-03

## 2025-03-03 RX ORDER — LORAZEPAM 0.5 MG/1
0.5 TABLET ORAL EVERY 8 HOURS PRN
Status: DISCONTINUED | OUTPATIENT
Start: 2025-03-03 | End: 2025-03-18 | Stop reason: HOSPADM

## 2025-03-03 RX ORDER — MAGNESIUM HYDROXIDE/ALUMINUM HYDROXICE/SIMETHICONE 120; 1200; 1200 MG/30ML; MG/30ML; MG/30ML
30 SUSPENSION ORAL EVERY 4 HOURS PRN
Status: DISCONTINUED | OUTPATIENT
Start: 2025-03-03 | End: 2025-03-18 | Stop reason: HOSPADM

## 2025-03-03 RX ORDER — RISPERIDONE 0.5 MG/1
0.5 TABLET ORAL
Status: DISCONTINUED | OUTPATIENT
Start: 2025-03-03 | End: 2025-03-18 | Stop reason: HOSPADM

## 2025-03-03 RX ORDER — BUPRENORPHINE 15 UG/H
1 PATCH TRANSDERMAL
Status: DISCONTINUED | OUTPATIENT
Start: 2025-03-03 | End: 2025-03-18 | Stop reason: HOSPADM

## 2025-03-03 RX ORDER — ALBUTEROL SULFATE 90 UG/1
2 INHALANT RESPIRATORY (INHALATION) EVERY 4 HOURS PRN
Status: DISCONTINUED | OUTPATIENT
Start: 2025-03-03 | End: 2025-03-18 | Stop reason: HOSPADM

## 2025-03-03 RX ORDER — RISPERIDONE 1 MG/1
1 TABLET, ORALLY DISINTEGRATING ORAL
Status: DISCONTINUED | OUTPATIENT
Start: 2025-03-03 | End: 2025-03-04

## 2025-03-03 RX ORDER — NICOTINE 21 MG/24HR
1 PATCH, TRANSDERMAL 24 HOURS TRANSDERMAL DAILY
Status: DISCONTINUED | OUTPATIENT
Start: 2025-03-03 | End: 2025-03-14

## 2025-03-03 RX ORDER — HYDROXYZINE HYDROCHLORIDE 50 MG/1
50 TABLET, FILM COATED ORAL
Status: DISCONTINUED | OUTPATIENT
Start: 2025-03-03 | End: 2025-03-18 | Stop reason: HOSPADM

## 2025-03-03 RX ORDER — PYRITHIONE ZINC 1 G/ML
2 LOTION/SHAMPOO TOPICAL DAILY
COMMUNITY
End: 2025-03-18

## 2025-03-03 RX ORDER — ACETAMINOPHEN 325 MG/1
975 TABLET ORAL EVERY 6 HOURS PRN
Status: DISCONTINUED | OUTPATIENT
Start: 2025-03-03 | End: 2025-03-18 | Stop reason: HOSPADM

## 2025-03-03 RX ORDER — BACLOFEN 20 MG
500 TABLET ORAL DAILY
COMMUNITY
End: 2025-03-18

## 2025-03-03 RX ORDER — LORAZEPAM 0.5 MG/1
0.5 TABLET ORAL ONCE
Status: COMPLETED | OUTPATIENT
Start: 2025-03-03 | End: 2025-03-03

## 2025-03-03 RX ORDER — VITAMIN B COMPLEX
1 CAPSULE ORAL DAILY
COMMUNITY
End: 2025-03-18

## 2025-03-03 RX ORDER — MONTELUKAST SODIUM 10 MG/1
10 TABLET ORAL DAILY
Status: DISCONTINUED | OUTPATIENT
Start: 2025-03-03 | End: 2025-03-18 | Stop reason: HOSPADM

## 2025-03-03 RX ORDER — RISPERIDONE 0.25 MG/1
0.25 TABLET ORAL
Status: DISCONTINUED | OUTPATIENT
Start: 2025-03-03 | End: 2025-03-03

## 2025-03-03 RX ADMIN — RISPERIDONE 1 MG: 1 TABLET, ORALLY DISINTEGRATING ORAL at 21:21

## 2025-03-03 RX ADMIN — LORAZEPAM 0.5 MG: 0.5 TABLET ORAL at 08:45

## 2025-03-03 RX ADMIN — Medication 3 MG: at 21:21

## 2025-03-03 RX ADMIN — LAMOTRIGINE 50 MG: 25 TABLET ORAL at 18:01

## 2025-03-03 RX ADMIN — HYDROXYZINE HYDROCHLORIDE 25 MG: 25 TABLET, FILM COATED ORAL at 03:40

## 2025-03-03 NOTE — LETTER
[facility]  [address] [facility] [address]  [phone]  Dept: [phone]    March 18,2025      Patient: Brisa Nick   YOB: 1965   Date of Visit: 3/3/2025       To Whom it May Concern:    Brisa Nick is was under the professional care of [facility] . She was seen in the hospital from 3/3/2025 to 3/18/2025. She  Will be following up with her primary care physician for further assistance.      If you have any questions or concerns, please don't hesitate to call.         Sincerely,          Oliver Holguin

## 2025-03-03 NOTE — NURSING NOTE
Patient arrived to [facility] at 1055 from [facility] ED. Patient states she is here because she had distorted reality by telling people in her way something was making her sick. PTA medication list is RN complete and both medical and psychiatric doctors were notified as well as C-SSRS lifetime and daily risk is low. Patient has anxiety and depression, denies SI/HI and hallucinations. Patient has hx of bipolar and ptsd. Patient is cooperative. Continued care and safety rounds in progress.

## 2025-03-03 NOTE — ED NOTES
"Patient requesting \"something to help me sleep\" at this time. Provider notified + new orders acknowledged      Alvina Perrin RN  03/03/25 0352       Alvina Perrin RN  03/03/25 0352    "

## 2025-03-03 NOTE — ED NOTES
Insurance Authorization for admission:   [facility] [employer].  Phone number: [phone].     Spoke to Kayleigh MCBRIDE     9 days approved.  Level of care: IP Psych 302.  Review on 3/11.   Authorization # [id number].      Concurrent reviewer: Marcia ZAMUDIO @ [phone], x [phone]

## 2025-03-03 NOTE — EMTALA/ACUTE CARE TRANSFER
[facility] EMERGENCY DEPARTMENT  [address] [place] [address]  Dept: [phone]      EMTALA TRANSFER CONSENT    NAME Brisa EGAN 1965                              MRN [id number]    I have been informed of my rights regarding examination, treatment, and transfer   by Dr. Yann Osborn MD    Benefits: Specialized equipment and/or services available at the receiving facility (Include comment)________________________ (Psychiatric care)    Risks:        Consent for Transfer:  I acknowledge that my medical condition has been evaluated and explained to me by the emergency department physician or other qualified medical person and/or my attending physician, who has recommended that I be transferred to the service of  Accepting Physician: Dr. Fernandez at Accepting Facility Name, City & State : [facility]. The above potential benefits of such transfer, the potential risks associated with such transfer, and the probable risks of not being transferred have been explained to me, and I fully understand them.  The doctor has explained that, in my case, the benefits of transfer outweigh the risks.  I agree to be transferred.    I authorize the performance of emergency medical procedures and treatments upon me in both transit and upon arrival at the receiving facility.  Additionally, I authorize the release of any and all medical records to the receiving facility and request they be transported with me, if possible.  I understand that the safest mode of transportation during a medical emergency is an ambulance and that the Hospital advocates the use of this mode of transport. Risks of traveling to the receiving facility by car, including absence of medical control, life sustaining equipment, such as oxygen, and medical personnel has been explained to me and I fully understand them.    (YANN CORRECT BOX BELOW)  [  ]  I consent to the stated transfer  and to be transported by ambulance/helicopter.  [  ]  I consent to the stated transfer, but refuse transportation by ambulance and accept full responsibility for my transportation by car.  I understand the risks of non-ambulance transfers and I exonerate the Hospital and its staff from any deterioration in my condition that results from this refusal.    X___________________________________________    DATE  25  TIME________  Signature of patient or legally responsible individual signing on patient behalf           RELATIONSHIP TO PATIENT_________________________          Provider Certification    NAME Brisa Nick                                        [facility] 1965                              MRN [id number]    A medical screening exam was performed on the above named patient.  Based on the examination:    Condition Necessitating Transfer The primary encounter diagnosis was Encounter for psychological evaluation. Diagnoses of Crohn's disease of colon with complication (HCC) and Bipolar disorder (HCC) were also pertinent to this visit.    Patient Condition: The patient has been stabilized such that within reasonable medical probability, no material deterioration of the patient condition or the condition of the unborn child(santana) is likely to result from the transfer    Reason for Transfer: Level of Care needed not available at this facility    Transfer Requirements: Facility [facility]   Space available and qualified personnel available for treatment as acknowledged by Marie HERNANDEZ; [phone]  Agreed to accept transfer and to provide appropriate medical treatment as acknowledged by       Dr. Fernandez  Appropriate medical records of the examination and treatment of the patient are provided at the time of transfer   STAFF INITIAL WHEN COMPLETED _______  Transfer will be performed by qualified personnel from    and appropriate transfer equipment as required, including the use of necessary and appropriate  life support measures.    Provider Certification: I have examined the patient and explained the following risks and benefits of being transferred/refusing transfer to the patient/family:  The patient is stable for psychiatric transfer because they are medically stable, and is protected from harming him/herself or others during transport      Based on these reasonable risks and benefits to the patient and/or the unborn child(santana), and based upon the information available at the time of the patient’s examination, I certify that the medical benefits reasonably to be expected from the provision of appropriate medical treatments at another medical facility outweigh the increasing risks, if any, to the individual’s medical condition, and in the case of labor to the unborn child, from effecting the transfer.    X____________________________________________ DATE 03/03/25        TIME_______      ORIGINAL - SEND TO MEDICAL RECORDS   COPY - SEND WITH PATIENT DURING TRANSFER
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
## 2025-03-03 NOTE — ED NOTES
"Patient is tangential, stating things such as \"Are you going to show me reality or keep lying to me?\" And \"I need something to calm down before I lose it but not something to put me to sleep\". Patient also believes she is going to be \"Locked away forever\". Provider explained the 302 process and purpose to patient and  0.5 ativan PO given at this time.      Alvina Perrin RN  03/02/25 8552    "

## 2025-03-03 NOTE — ED NOTES
Writer assumed care of pt at this time. Pt noted to be sleeping, vitals stable, no signs of distress noted. Pt continues on virtual observation.      Myra Fu RN  03/03/25 0711
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
## 2025-03-03 NOTE — TREATMENT PLAN
TREATMENT PLAN REVIEW - Behavioral Health Brisa Nick 59 y.o. 1965 female MRN: [id number]    [facility] Room / Bed: [facility] 205/[facility] 205-01 Encounter: [id number]          Admit Date/Time:  3/3/2025 10:55 AM    Treatment Team:   MD Jaylin Suarez RN Veronda Sestok Angela Boyko Patricia McEvoy Angela M Chapman, LPN    Diagnosis: Principal Problem:    Bipolar affective disorder, depressed, severe, with psychotic behavior ([facility])  Active Problems:    Degenerative disc disease, cervical    Migraine headache    Fibromyalgia    Posttraumatic stress disorder    Crohn's colitis ([facility])    GERD (gastroesophageal reflux disease)      Patient Strengths/Assets: family ties, negotiates basic needs    Patient Barriers/Limitations: chronic mental illness, limited insight, patient is on an involuntary commitment, poor insight, self-care deficit    Short Term Goals: decrease in depressive symptoms, decrease in anxiety symptoms, decrease in psychotic symptoms, ability to stay safe on the unit, improvement in reasoning ability, improvement in self care, sleep improvement, improvement in appetite, mood stabilization, increase in group attendance, increase in socialization with peers on the unit, acceptance of need for psychiatric treatment, acceptance of psychiatric medications    Long Term Goals: improvement in depression, improvement in anxiety, resolution of manic symptoms, stabilization of mood, resolution of psychotic symptoms, improvement in reality testing, improvement in reasoning ability, improved insight, acceptance of need for psychiatric medications, acceptance of need for psychiatric treatment, acceptance of psychiatric diagnosis, adequate self care, adequate sleep, adequate appetite, adequate oral intake, appropriate interaction with peers    Progress Towards Goals: starting psychiatric medications as prescribed    Recommended Treatment: medication management,  patient medication education, group therapy, milieu therapy, continued Behavioral Health psychiatric evaluation/assessment process, medical follow up with medical team    Treatment Frequency: daily medication monitoring, group and milieu therapy daily, monitoring through interdisciplinary rounds, monitoring through weekly patient care conferences    Expected Discharge Date: 10 midnights     Discharge Plan: will be determined    Treatment Plan Created/Updated By: Khoi Beltran MD

## 2025-03-03 NOTE — ED NOTES
Numerus bags of patient's belongings retrieved from locker 22. All bags from locker placed in large bag so nothing would be misplaced and patient stickers place of the bag to identify. Bag is at nursing station to prepare to patient transport.       David Laureano  03/03/25 0853       David Laureano  03/03/25 0855
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
## 2025-03-03 NOTE — NURSING NOTE
Butrans patch removed from patient's right hip and disposed with Charline HARE RN. Patient states it was due to be changed on Saturday 3/1.

## 2025-03-03 NOTE — ED NOTES
"Crisis worker met with pt to inform her of 302 admission and her rights. Pt was with rambling speech, but was cooperative. She did not appear to understand her treatment rights at this time, but did focus on being put away \"forever\". She rambled about reality, brain damage, finances, and not breathing while she sleeps. She was focused on her vitamin deficiency as the reason why her monitor #s were the way they are.     302 was petitioned by Pt daughter Meeta and upheld by ED attending Dr. Miladis Ornelas. Completed 302 was emailed to [facility].   "

## 2025-03-03 NOTE — ED NOTES
Pt ambulated to the bathroom with a steady gait. Pt tearful during the walk and rambling about life and getting the help that she needs. Pt cooperative and back on stretcher.      Myra Fu RN  03/03/25 0926
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
## 2025-03-03 NOTE — TREATMENT TEAM
03/03/25 1238 03/03/25 1239   Provider Notification   Reason for Communication Admission  (PTA/CSSRS) Admission  (PTA)   Provider Name Dr. Ruby Dailey   Provider Role Attending physician Attending physician   Method of Communication Other (Comment)  (Epic) Other (Comment)  (Epic)   Response No new orders  (Orders in place) Waiting for response   Notification Time 1237 1232     This nurse spoke with patient's daughter, Meeta, who informed this nurse that she is not aware of any of patient's current medications as patient has not allowed family to participate in medical care recently.  Spoke with [facility] in [place] and they state they only ever received one antibiotic for the patient several months ago and she has not had any other medications filled there.  Spoke with [facility] in [place] and they confirmed most of patient's medication list and the last date the medications were filled.  Patient is a poor historian and disorganized and unable to state when she has last taken any medications. A Butrans patch was found on her right hip and disposed of properly with Jaylin HARE RN. She states it was due to be changed on Saturday 3/1. PTA medication list completed to the best of this nurse's ability at this time.

## 2025-03-03 NOTE — ED NOTES
Patient is accepted at [facility].  Patient is accepted by Dr. Fernandez.     Transportation is arranged with [facility].     Transportation is scheduled for 0930 via [facility].   Patient may go to the floor at anytime.          Nurse report is to be called to [phone] prior to patient transfer.

## 2025-03-04 LAB
25(OH)D3 SERPL-MCNC: 37.1 NG/ML (ref 30–100)
ALBUMIN SERPL BCG-MCNC: 3.9 G/DL (ref 3.5–5)
ALP SERPL-CCNC: 46 U/L (ref 34–104)
ALT SERPL W P-5'-P-CCNC: 18 U/L (ref 7–52)
ANION GAP SERPL CALCULATED.3IONS-SCNC: 7 MMOL/L (ref 4–13)
AST SERPL W P-5'-P-CCNC: 19 U/L (ref 13–39)
BASOPHILS # BLD AUTO: 0.06 THOUSANDS/ÂΜL (ref 0–0.1)
BASOPHILS NFR BLD AUTO: 1 % (ref 0–1)
BILIRUB SERPL-MCNC: 0.51 MG/DL (ref 0.2–1)
BUN SERPL-MCNC: 14 MG/DL (ref 5–25)
CALCIUM SERPL-MCNC: 9.2 MG/DL (ref 8.4–10.2)
CHLORIDE SERPL-SCNC: 104 MMOL/L (ref 96–108)
CHOLEST SERPL-MCNC: 146 MG/DL (ref ?–200)
CO2 SERPL-SCNC: 29 MMOL/L (ref 21–32)
CREAT SERPL-MCNC: 0.77 MG/DL (ref 0.6–1.3)
EOSINOPHIL # BLD AUTO: 0.25 THOUSAND/ÂΜL (ref 0–0.61)
EOSINOPHIL NFR BLD AUTO: 3 % (ref 0–6)
ERYTHROCYTE [DISTWIDTH] IN BLOOD BY AUTOMATED COUNT: 12.8 % (ref 11.6–15.1)
FOLATE SERPL-MCNC: 21.1 NG/ML
GFR SERPL CREATININE-BSD FRML MDRD: 83 ML/MIN/1.73SQ M
GLUCOSE P FAST SERPL-MCNC: 80 MG/DL (ref 65–99)
GLUCOSE SERPL-MCNC: 80 MG/DL (ref 65–140)
HCT VFR BLD AUTO: 44.4 % (ref 34.8–46.1)
HDLC SERPL-MCNC: 37 MG/DL
HGB BLD-MCNC: 14.3 G/DL (ref 11.5–15.4)
IMM GRANULOCYTES # BLD AUTO: 0.02 THOUSAND/UL (ref 0–0.2)
IMM GRANULOCYTES NFR BLD AUTO: 0 % (ref 0–2)
LDLC SERPL CALC-MCNC: 83 MG/DL (ref 0–100)
LYMPHOCYTES # BLD AUTO: 2.12 THOUSANDS/ÂΜL (ref 0.6–4.47)
LYMPHOCYTES NFR BLD AUTO: 26 % (ref 14–44)
MCH RBC QN AUTO: 29.1 PG (ref 26.8–34.3)
MCHC RBC AUTO-ENTMCNC: 32.2 G/DL (ref 31.4–37.4)
MCV RBC AUTO: 90 FL (ref 82–98)
MONOCYTES # BLD AUTO: 0.63 THOUSAND/ÂΜL (ref 0.17–1.22)
MONOCYTES NFR BLD AUTO: 8 % (ref 4–12)
NEUTROPHILS # BLD AUTO: 5.07 THOUSANDS/ÂΜL (ref 1.85–7.62)
NEUTS SEG NFR BLD AUTO: 62 % (ref 43–75)
NONHDLC SERPL-MCNC: 109 MG/DL
NRBC BLD AUTO-RTO: 0 /100 WBCS
PLATELET # BLD AUTO: 278 THOUSANDS/UL (ref 149–390)
PMV BLD AUTO: 10.8 FL (ref 8.9–12.7)
POTASSIUM SERPL-SCNC: 3.9 MMOL/L (ref 3.5–5.3)
PROT SERPL-MCNC: 6.5 G/DL (ref 6.4–8.4)
RBC # BLD AUTO: 4.92 MILLION/UL (ref 3.81–5.12)
SODIUM SERPL-SCNC: 140 MMOL/L (ref 135–147)
TREPONEMA PALLIDUM IGG+IGM AB [PRESENCE] IN SERUM OR PLASMA BY IMMUNOASSAY: NORMAL
TRIGL SERPL-MCNC: 128 MG/DL (ref ?–150)
VIT B12 SERPL-MCNC: 314 PG/ML (ref 180–914)
WBC # BLD AUTO: 8.15 THOUSAND/UL (ref 4.31–10.16)

## 2025-03-04 PROCEDURE — 86780 TREPONEMA PALLIDUM: CPT | Performed by: PSYCHIATRY & NEUROLOGY

## 2025-03-04 PROCEDURE — 80061 LIPID PANEL: CPT | Performed by: PSYCHIATRY & NEUROLOGY

## 2025-03-04 PROCEDURE — 80053 COMPREHEN METABOLIC PANEL: CPT | Performed by: PSYCHIATRY & NEUROLOGY

## 2025-03-04 PROCEDURE — 82746 ASSAY OF FOLIC ACID SERUM: CPT | Performed by: PSYCHIATRY & NEUROLOGY

## 2025-03-04 PROCEDURE — 82306 VITAMIN D 25 HYDROXY: CPT | Performed by: PSYCHIATRY & NEUROLOGY

## 2025-03-04 PROCEDURE — 85025 COMPLETE CBC W/AUTO DIFF WBC: CPT | Performed by: PSYCHIATRY & NEUROLOGY

## 2025-03-04 PROCEDURE — 99232 SBSQ HOSP IP/OBS MODERATE 35: CPT | Performed by: PSYCHIATRY & NEUROLOGY

## 2025-03-04 PROCEDURE — 82607 VITAMIN B-12: CPT | Performed by: PSYCHIATRY & NEUROLOGY

## 2025-03-04 RX ORDER — ARIPIPRAZOLE 2 MG/1
2 TABLET ORAL DAILY
Status: DISCONTINUED | OUTPATIENT
Start: 2025-03-05 | End: 2025-03-05

## 2025-03-04 RX ADMIN — FOLIC ACID 1 MG: 1 TABLET ORAL at 08:50

## 2025-03-04 RX ADMIN — MONTELUKAST 10 MG: 10 TABLET, FILM COATED ORAL at 08:50

## 2025-03-04 RX ADMIN — THIAMINE HCL TAB 100 MG 100 MG: 100 TAB at 08:50

## 2025-03-04 RX ADMIN — LAMOTRIGINE 50 MG: 25 TABLET ORAL at 08:50

## 2025-03-04 RX ADMIN — NICOTINE 1 PATCH: 14 PATCH, EXTENDED RELEASE TRANSDERMAL at 08:50

## 2025-03-04 RX ADMIN — LAMOTRIGINE 50 MG: 25 TABLET ORAL at 17:39

## 2025-03-04 RX ADMIN — BUPRENORPHINE 1 PATCH: 15 PATCH, EXTENDED RELEASE TRANSDERMAL at 17:40

## 2025-03-04 RX ADMIN — Medication 3 MG: at 20:50

## 2025-03-04 RX ADMIN — CHOLECALCIFEROL TAB 25 MCG (1000 UNIT) 2000 UNITS: 25 TAB at 08:50

## 2025-03-04 RX ADMIN — Medication 1 TABLET: at 08:50

## 2025-03-04 RX ADMIN — TRAZODONE HYDROCHLORIDE 50 MG: 50 TABLET ORAL at 20:50

## 2025-03-04 NOTE — PROGRESS NOTES
"Progress Note - Brisa Nick 59 y.o. female MRN: [id number]    Unit/Bed#: -01 Encounter: [id number]        Subjective:   Patient seen and examined at bedside after reviewing the chart and discussing the case with the caring staff.      Patient examined at bedside.  Patient has no acute symptoms.    Labs pending.    Physical Exam   Vitals: Blood pressure 107/52, pulse 67, temperature 98 °F (36.7 °C), temperature source Temporal, resp. rate 18, height 5' 3\" (1.6 m), weight 74.1 kg (163 lb 6.4 oz), SpO2 95%.,Body mass index is 28.95 kg/m².  Constitutional: Patient in no acute distress.  HEENT: PERR, EOMI, MMM.  Cardiovascular: Normal rate and regular rhythm.    Pulmonary/Chest: Effort normal and breath sounds normal.   Abdomen: Soft, + BS, NT.    Assessment/Plan:  Brisa Nick is a(n) 59 y.o. year old female with bipolar affective disorder.     Asthma.  Patient is on Singulair 10 mg daily.  Albuterol inhaler as needed.  Chronic pain syndrome.  Involving lower back pain and bilateral hips.  Patient follows with pain management on outpt basis and on Butrans patch weekly.  Tylenol as needed.  Nicotine addiction.  NRT.  Alcohol abuse.  Patient started thiamine, folic acid and multivitamin.  Insomnia.  Patient is on melatonin 3 mg at bedtime.  Vitamin D deficiency.  Continue vitamin D supplement.  Dry eyes.  Patient is on artificial tears twice daily.    The patient was discussed with Dr. Dailey and he is in agreement with the above note.  "

## 2025-03-04 NOTE — PROGRESS NOTES
Discussed with patient: AUDIT score of 0 UDS/Identified Substance(s) used: Alcohol  Risks discussed included: Physical and Mental Health Risks  Recommendations discussed:  OP D&A Services  Patient's response: Declined referral

## 2025-03-04 NOTE — PLAN OF CARE
Problem: Risk for Self Injury/Neglect  Goal: Refrain from harming self  Description: Interventions:  - Monitor patient closely, per order  - Develop a trusting relationship  - Supervise medication ingestion, monitor effects and side effects   Outcome: Progressing     Problem: Anxiety  Goal: Anxiety is at manageable level  Description: Interventions:  - Assess and monitor patient's anxiety level.   - Monitor for signs and symptoms (heart palpitations, chest pain, shortness of breath, headaches, nausea, feeling jumpy, restlessness, irritable, apprehensive).   - Collaborate with interdisciplinary team and initiate plan and interventions as ordered.  - [place] patient to unit/surroundings  - Explain treatment plan  - Encourage participation in care  - Encourage verbalization of concerns/fears  - Identify coping mechanisms  - Assist in developing anxiety-reducing skills  - Administer/offer alternative therapies  - Limit or eliminate stimulants  Outcome: Progressing

## 2025-03-04 NOTE — PROGRESS NOTES
Progress Note - Behavioral Health   Name: Brisa Nick 59 y.o. female I MRN: [id number]   Unit/Bed#: [facility] 205-01 I Date of Admission: 3/3/2025   Date of Service: 3/4/2025 I Hospital Day: 1         Assessment & Plan  Degenerative disc disease, cervical    Migraine headache    Fibromyalgia    Posttraumatic stress disorder    Crohn's colitis (HCC)    GERD (gastroesophageal reflux disease)    Bipolar affective disorder, depressed, severe, with psychotic behavior (HCC)        Recommended Treatment:     Planned medication and treatment changes:    All current active medications have been reviewed  Encourage group therapy, milieu therapy and occupational therapy  Behavioral Health checks for safety monitoring  303 hearing this week  Risperidal M-tab 1 mg po hs.  Lamictal 50 mg po bid.  Monitor fall elopement and fall risk.    Current medications:  Current Facility-Administered Medications   Medication Dose Route Frequency Provider Last Rate    acetaminophen  650 mg Oral Q4H PRN Mary Fernandez MD      acetaminophen  650 mg Oral Q4H PRN Mary Fernandez MD      acetaminophen  975 mg Oral Q6H PRN Mary Fernandez MD      albuterol  2 puff Inhalation Q4H PRN Brad Dailey MD      aluminum-magnesium hydroxide-simethicone  30 mL Oral Q4H PRN Mary Fernandez MD      Artificial Tears  1 drop Both Eyes Q12H [facility] Brad Dailey MD      buprenorphine  1 patch Transdermal Q7 Days Brad Dailey MD      cholecalciferol  2,000 Units Oral Daily Mary Fernandez MD      folic acid  1 mg Oral Daily Brad Dailey MD      haloperidol lactate  5 mg Intramuscular Q4H PRN Max 4/day Mary Fernandez MD      hydrOXYzine HCL  25 mg Oral Q6H PRN Max 4/day Mary Fernandez MD      hydrOXYzine HCL  50 mg Oral Q6H PRN Max 4/day Mary Fernandez MD      lamoTRIgine  50 mg Oral BID Mary Fernandez MD      LORazepam  0.5 mg Oral Q8H PRN Khoi Beltran MD      melatonin  3 mg Oral HS Mary Fernandez MD      montelukast  10 mg Oral Daily [facility]  MD Asim      multivitamin-minerals  1 tablet Oral Daily Brad Dailey MD      nicotine  1 patch Transdermal Daily Brad Dailey MD      nicotine polacrilex  4 mg Oral Q2H PRN Mary Fernandez MD      OLANZapine  5 mg Oral TID PRN Khoi Beltran MD      propranolol  5 mg Oral Q8H PRN Mary Fernandez MD      risperiDONE  1 mg Oral HS Khoi Beltran MD      risperiDONE  0.5 mg Oral Q4H PRN Max 6/day Khoi Beltran MD      risperiDONE  1 mg Oral Q4H PRN Max 3/day Khoi Beltran MD      thiamine  100 mg Oral Daily Brad Dailey MD      traZODone  50 mg Oral HS PRN Khoi Beltran MD         Risks / Benefits of Treatment:    Risks, benefits, and possible side effects of medications explained to patient. Patient has limited understanding of risks and benefits of treatment at this time, but agrees to take medications as prescribed.    Subjective:    Behavior over the last 24 hours: unchanged.     Brisa was seen for continuing care. Staff report that patient continues isolated,  suspicious and disorganized. Patient remains poor historian, confused and smiling at time. She is unable to state the reason she came into the hospital and continues paranoid and internally preoccupied. Patient took her medication last night except buprenorphine. She was explained about the [address] for this week but does not verbalized understanding presently. No acute focal neurological deficit or change in mental status noted at this time.     Sleep: slept better  Appetite: fair  Medication side effects: No   ROS: all other systems are negative, except chronic pain    Mental Status Evaluation:    Appearance:  age appropriate   Behavior:  guarded   Speech:  decreased rate, soft   Mood:  dysphoric   Affect:  constricted   Thought Process:  disorganized, tangential   Associations: loose associations   Thought Content:  some paranoia   Perceptual Disturbances: appears preoccupied   Risk Potential: Suicidal ideation - None at present  Homicidal  ideation - None at present  Potential for aggression - Not at present   Sensorium:  oriented to person and place   Memory:  recent and remote memory: unable to assess due to lack of cooperation   Consciousness:  alert and awake   Attention/Concentration: attention span and concentration appear shorter than expected for age   Insight:  poor   Judgment: poor   Gait/Station: in bed   Motor Activity: no abnormal movements     Vital signs in last 24 hours:    Temp:  [97.4 °F (36.3 °C)-98 °F (36.7 °C)] 98 °F (36.7 °C)  HR:  [67-95] 67  BP: (107-134)/(52-64) 107/52  Resp:  [18] 18  SpO2:  [93 %-95 %] 95 %  O2 Device: None (Room air)         Laboratory results: I have personally reviewed all pertinent laboratory/tests results    Most Recent Labs:   Lab Results   Component Value Date    WBC 8.15 03/04/2025    RBC 4.92 03/04/2025    HGB 14.3 03/04/2025    HCT 44.4 03/04/2025     03/04/2025    RDW 12.8 03/04/2025    NEUTROABS 5.07 03/04/2025    SODIUM 140 03/04/2025    K 3.9 03/04/2025     03/04/2025    CO2 29 03/04/2025    BUN 14 03/04/2025    CREATININE 0.77 03/04/2025    GLUC 80 03/04/2025    CALCIUM 9.2 03/04/2025    AST 19 03/04/2025    ALT 18 03/04/2025    ALKPHOS 46 03/04/2025    TP 6.5 03/04/2025    ALB 3.9 03/04/2025    TBILI 0.51 03/04/2025    CHOLESTEROL 146 03/04/2025    HDL 37 (L) 03/04/2025    TRIG 128 03/04/2025    LDLCALC 83 03/04/2025    NONHDLC 109 03/04/2025    [id number] 0.326 (L) 03/02/2025    FREET4 0.97 03/02/2025    HGBA1C 5.8 (H) 01/21/2025     01/21/2025       Counseling / Coordination of Care:    Patient's progress discussed with staff in treatment team meeting.  Supportive therapy provided to patient.  Group attendance encouraged.  At this time patient has limited understanding of diagnosis, medication changes and treatment plan and will require further explanation.    Khoi Beltran MD 03/04/25
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
## 2025-03-04 NOTE — PROGRESS NOTES
03/04/25 1300   Team Meeting   Meeting Type Tx Team Meeting   Initial Conference Date 03/04/25   Next Conference Date 04/04/25   Team Members Present   Team Members Present Physician;Nurse;   Physician Team Member Dr. Khoi Beltran   Nursing Team Member Cecily Wolf RN   Care Management Team Member Alisa Allan RN   Patient/Family Present   Patient Present Yes   Patient's Family Present No     Initial Plan  Treatment Team met and reviewed patient strengths, limitations, coping skills, Treatment Plan and Goals; patient reported understanding and agreement and signed the Treatment Plan document. A copy was placed on the chart.

## 2025-03-04 NOTE — PLAN OF CARE
Problem: DISCHARGE PLANNING - CARE MANAGEMENT  Goal: Discharge to post-acute care or home with appropriate resources  Description: INTERVENTIONS:  - Conduct assessment to determine patient/family and health care team treatment goals, and need for post-acute services based on payer coverage, community resources, and patient preferences, and barriers to discharge  - Address psychosocial, clinical, and financial barriers to discharge as identified in assessment in conjunction with the patient/family and health care team  - Arrange appropriate level of post-acute services according to patient’s   needs and preference and payer coverage in collaboration with the physician and health care team  - Communicate with and update the patient/family, physician, and health care team regarding progress on the discharge plan  - Arrange appropriate transportation to post-acute venues  Outcome: Progressing   New goal initiated, 302

## 2025-03-04 NOTE — NURSING NOTE
Pt lying in bed throughout the evening. Denies SI/HI/AH/VH; however, other than those answers the pt did not respond appropriately to assessment questions. She called me several different names in one sentence. Pt is tangential, rambling, disorganized and confused. Q 15 minute checks ongoing.

## 2025-03-04 NOTE — NURSING NOTE
Patient appeared to have slept throughout most of the night. Pt did briefly awaken twice. Respirations even and unlabored. No acute behaviors. Q15 minute checks continue.

## 2025-03-04 NOTE — NURSING NOTE
Patient wanders unit and into other patient rooms redirectable; goes through roommates belongings and requires constant redirection with this. Patient is oriented to self confused, disorganized and tangential. Preoccupied religiously. Unable to reality orient. Compliant with medications upon cuing. Q 15 min safety checks maintained.

## 2025-03-04 NOTE — PROGRESS NOTES
03/04/25    Team Meeting   Meeting Type Daily Rounds   Team Members Present   Team Members Present Physician;Nurse;Occupational Therapist;   Physician Team Member Ruby Fierro MD   Nursing Team Member Luciano WATERS   Social Work Team Member Horacio CALVIN   OT Team Member Gino Garvey   Patient/Family Present   Patient Present No   Patient's Family Present No   302, new admit, paranoid, random thoughts, disorganized, loose, religiously preoccupied, confused, tangential, hx bipolar

## 2025-03-04 NOTE — CASE MANAGEMENT
"RANDAL placed call to patient's daughter, Meeta:[phone] for family notification. Meeta reported patient's psychiatric history dating back to \"at least as far back as my childhood.\" \"She's been 302'd at least 4 or 5 times.\" The daughter feels that contributing factors, in addition to lifestyle issues, are non-compliance with medications or suicidality following anti-depressant prescriptions. She did state that there was a medication adjustment recently due to SE issues. She was able to provide some information requested for psychosocial completion, although she stated that the patient \"blocked me and my brother's from access to a lot of psychiatric information.\" The daughter and family are in agreement with treatment and are available for contact as needed. [employer] is available as well for transportation home at the time of discharge.   Relevant phone numbers were provided for future contact as needed. Meeta had no further questions or concerns. The call ended mutually.     RANDAL placed call to patient's psychiatric providers office: [facility]: [phone] for admission notification. Spoke with office staff who reported the patient's next Virtual therapy appointment with Marcia Waddell is scheduled for 3/7/25 which will be cancelled. The following appt scheduled for 3/21/25 at 12 Noon will be kept unless rescheduling required due to continued stay.  Med Mgmt appt with Dr. Zuniga will be scheduled at the time of patient d/c.     RANDAL placed call to patient's PCP office, Dr. Guy: [phone] for admission notification. RANDAL left VM message with request for return call to confirm affiliation with practice and arrange aftercare appt scheduling.    "

## 2025-03-05 PROCEDURE — 99232 SBSQ HOSP IP/OBS MODERATE 35: CPT | Performed by: PSYCHIATRY & NEUROLOGY

## 2025-03-05 RX ORDER — LORAZEPAM 0.5 MG/1
0.5 TABLET ORAL
Status: DISCONTINUED | OUTPATIENT
Start: 2025-03-05 | End: 2025-03-10

## 2025-03-05 RX ORDER — ARIPIPRAZOLE 5 MG/1
5 TABLET ORAL DAILY
Status: DISCONTINUED | OUTPATIENT
Start: 2025-03-06 | End: 2025-03-10

## 2025-03-05 RX ORDER — LORAZEPAM 0.5 MG/1
0.5 TABLET ORAL 2 TIMES DAILY
Status: DISCONTINUED | OUTPATIENT
Start: 2025-03-06 | End: 2025-03-05

## 2025-03-05 RX ORDER — TRAZODONE HYDROCHLORIDE 50 MG/1
50 TABLET ORAL
Status: DISCONTINUED | OUTPATIENT
Start: 2025-03-05 | End: 2025-03-11

## 2025-03-05 RX ADMIN — DEXTRAN 70, GLYCERIN, HYPROMELLOSE 1 DROP: 1; 2; 3 SOLUTION/ DROPS OPHTHALMIC at 21:03

## 2025-03-05 RX ADMIN — Medication 1 TABLET: at 08:44

## 2025-03-05 RX ADMIN — ARIPIPRAZOLE 2 MG: 2 TABLET ORAL at 08:44

## 2025-03-05 RX ADMIN — DEXTRAN 70, GLYCERIN, HYPROMELLOSE 1 DROP: 1; 2; 3 SOLUTION/ DROPS OPHTHALMIC at 08:44

## 2025-03-05 RX ADMIN — THIAMINE HCL TAB 100 MG 100 MG: 100 TAB at 08:44

## 2025-03-05 RX ADMIN — LAMOTRIGINE 50 MG: 25 TABLET ORAL at 17:20

## 2025-03-05 RX ADMIN — NICOTINE 1 PATCH: 14 PATCH, EXTENDED RELEASE TRANSDERMAL at 08:44

## 2025-03-05 RX ADMIN — FOLIC ACID 1 MG: 1 TABLET ORAL at 08:44

## 2025-03-05 RX ADMIN — LORAZEPAM 0.5 MG: 0.5 TABLET ORAL at 01:03

## 2025-03-05 RX ADMIN — LAMOTRIGINE 50 MG: 25 TABLET ORAL at 08:44

## 2025-03-05 RX ADMIN — Medication 3 MG: at 21:00

## 2025-03-05 RX ADMIN — LORAZEPAM 0.5 MG: 0.5 TABLET ORAL at 21:00

## 2025-03-05 RX ADMIN — CHOLECALCIFEROL TAB 25 MCG (1000 UNIT) 2000 UNITS: 25 TAB at 08:44

## 2025-03-05 RX ADMIN — MONTELUKAST 10 MG: 10 TABLET, FILM COATED ORAL at 08:44

## 2025-03-05 RX ADMIN — TRAZODONE HYDROCHLORIDE 50 MG: 50 TABLET ORAL at 21:00

## 2025-03-05 NOTE — PLAN OF CARE
Problem: Alteration in Thoughts and Perception  Goal: Attend and participate in unit activities, including therapeutic, recreational, and educational groups  Description: Interventions:  -Encourage Visitation and family involvement in care  Outcome: Not Progressing     Problem: Ineffective Coping  Goal: Participates in unit activities  Description: Interventions:  - Provide therapeutic environment   - Provide required programming   - Redirect inappropriate behaviors   Outcome: Not Progressing     Problem: Risk for Self Injury/Neglect  Goal: Attend and participate in unit activities, including therapeutic, recreational, and educational groups  Description: Interventions:  - Provide therapeutic and educational activities daily, encourage attendance and participation, and document same in the medical record  - Obtain collateral information, encourage visitation and family involvement in care   Outcome: Not Progressing     Problem: Depression  Goal: Attend and participate in unit activities, including therapeutic, recreational, and educational groups  Description: Interventions:  - Provide therapeutic and educational activities daily, encourage attendance and participation, and document same in the medical record   Outcome: Not Progressing     Problem: Risk for Violence/Aggression Toward Others  Goal: Attend and participate in unit activities, including therapeutic, recreational, and educational groups  Description: Interventions:  - Provide therapeutic and educational activities daily, encourage attendance and participation, and document same in the medical record   Outcome: Not Progressing     Problem: Alteration in Orientation  Goal: Attend and participate in unit activities, including therapeutic, recreational, and educational groups  Description: Interventions:  - Provide therapeutic and educational activities daily, encourage attendance and participation, and document same in the medical record   - Provide  appropriate opportunities for reminiscence   - Provide a consistent daily routine   - Encourage family contact/visitation   Outcome: Not Progressing

## 2025-03-05 NOTE — SOCIAL WORK
03/05/25    Team Meeting   Meeting Type Daily Rounds   Team Members Present   Team Members Present Physician;Nurse;;Occupational Therapist   Physician Team Member Ruby METCALF, Sri METCALF, Dalia Odonnell   Nursing Team Member Luciano WATERS   Social Work Team Member Ezio CALVIN   OT Team Member Gino HAND   Patient/Family Present   Patient Present No   Patient's Family Present No   201, broken sleep, trazodone 50 ineffective , ativan 1 am , disorganized , med complaint. No D/C date med adjustments.

## 2025-03-05 NOTE — NURSING NOTE
"Pt calm and cooperative. Fair to poor appetite. Visible at times, but keeps mostly to self. Attends afternoon group. Pleasant and polite on approach. Oriented to self only. Forgetful, confused, and disorganized. Describes awareness of \"feeling confused\". On assessment Pt endorses fluctuating anxiety and depression. Denies SI/HI. Currently denies psychosis. Describes AV/H at times, \"but not today\". Agreeable to notify staff of any sudden change in mood or increase in psychosis. Describes fears that \"I hurt someone\" and \"that I have alzheimer's\", but is unable to elaborate on thought process. Pt describes wanting a \"change of meds to help me\", but appears suspicious of medications. Needs encouragement to take. Is compliant w/ mouth check. Remains behaviorally controlled. No need to to utilize PRN medications. Q15 safety checks maintained. Will cont to provide support as needed.   "

## 2025-03-05 NOTE — NURSING NOTE
Patient appears to have had broken sleep through the majority of the overnight hours. Trazodone ineffective, Ativan mildly effective. No concerns voiced, no signs of distress. Continuous 15 min safety checks in place.

## 2025-03-05 NOTE — MALNUTRITION/BMI
This medical record reflects one or more clinical indicators suggestive of malnutrition and/or morbid obesity.    Malnutrition Findings:   Adult Malnutrition type: Chronic illness  Adult Degree of Malnutrition: Malnutrition of moderate degree  Malnutrition Characteristics: Inadequate energy, Weight loss              360 Statement: Malnutrition related to inadequate energy intake as evidenced by 34#(17%) weight loss from 9/10/# to 3/3/# and <75% energy intake compared to estimated needs > 1 month.    Regular diet thin liquids with ensure plus high protein once daily.     BMI Findings:           Body mass index is 28.95 kg/m².     See Nutrition note dated 3/5/2025 for additional details.  Completed nutrition assessment is viewable in the nutrition documentation.

## 2025-03-05 NOTE — NUTRITION
03/05/25 1418   Biochemical Data,Medical Tests, and Procedures   Biochemical Data/Medical Tests/Procedures Lab values reviewed;Meds reviewed   Labs (Comment) 3/4 CMP WNL, CBC WNL, HDL:37. 3/2 TSH:0.326   Meds (Comment) Vit D, vit B12, folvite, haldol, atarax, ativan, melatonin, centrum, zyprexa, thiamne, desyrel   Nutrition-Focused Physical Exam   Nutrition-Focused Physical Exam Findings RN skin assessment reviewed;No skin issues documented   Nutrition-Focused Physical Exam Findings Oriented to person.   Medical-Related Concerns alcohol abuse, bipolar disorder, chronic pain, migraine, mood disorder, Vit D deficiency   Adequacy of Intake   Nutrition Modality PO   Feeding Route   PO Independent   Current PO Intake   Current Diet Order Regular diet thin liquids   Current Meal Intake 0-25%;25-50%   Estimated calorie intake compared to estimated need Nutrient needs not met.   PES Statement   Problem Intake   Energy Balance (1) Inadequate energy intake NI-1.2   Related to Other (Comment)  (psychiatric illness)   As evidenced by: Intake < estimated needs;Weight loss   Recommendations/Interventions   Malnutrition/BMI Present Yes   Adult Malnutrition type Chronic illness   Adult Degree of Malnutrition Malnutrition of moderate degree   Malnutrition Characteristics Inadequate energy;Weight loss   360 Statement Malnutrition related to inadequate energy intake as evidenced by 34#(17%) weight loss from 9/10/# to 3/3/# and <75% energy intake compared to estimated needs > 1 month.   Summary RN consult. Regular diet thin liquids. Meal completions vary 0-50%. Patient is a limited historian and is disoriented with confusion. Allergies to banana, melon and nuts. Per discussion with staff, nutrition interview not appropriate at this time. Chart utilized for information. 3/3/#; 2/13/#, 14#(7.9%) loss; 9/10/#, 34#(17%) loss in 6 months; 3/12/#. Significant weight loss present. Skin intact. Recommend  add ensure plus HP once daily.   Interventions/Recommendations Continue current diet order;Supplement initiate   Education Assessment   Education Education not indicated at this time;Patient/caregiver not appropriate for education at this time   Patient Nutrition Goals   Goal Avoid weight loss;Meet PO needs   Goal Status Initiated   Timeframe to complete goal by next f/u   Nutrition Complexity Risk   Nutrition complexity level High risk   Follow up date 03/12/25

## 2025-03-05 NOTE — TREATMENT TEAM
03/05/25 0103   Mckinney Anxiety Scale   Anxious Mood 4   Tension 4   Fears 4   Insomnia 4   Intellectual 3   Depressed Mood 0   Somatic Complaints: Muscular 0   Somatic Complaints: Sensory 0   Cardiovascular Symptoms 0   Respiratory Symptoms 0   Gastrointestinal Symptoms 0   Genitourinary Symptoms 0   Autonomic Symptoms 2   Behavior at Interview 4   Mckinney Anxiety Score 25       Ativan 0.5 mg given for severe anxiety. Pt stated she woke up from a nightmare, appears restless and confused, will re-assess.

## 2025-03-05 NOTE — PROGRESS NOTES
Progress Note - Behavioral Health   Name: Brisa Nick 59 y.o. female I MRN: [id number]   Unit/Bed#: [facility] 205-01 I Date of Admission: 3/3/2025   Date of Service: 3/5/2025 I Hospital Day: 2         Assessment & Plan  Degenerative disc disease, cervical    Migraine headache    Fibromyalgia    Posttraumatic stress disorder    Crohn's colitis (HCC)    GERD (gastroesophageal reflux disease)    Bipolar affective disorder, depressed, severe, with psychotic behavior (HCC)        Recommended Treatment:     Planned medication and treatment changes:    All current active medications have been reviewed  Encourage group therapy, milieu therapy and occupational therapy  Behavioral Health checks for safety monitoring  303 hearing this week  Abilify 5 mg po daily.  Lamictal 50 mg po bid.  Trazodone 50 mg po hs.  Ativan 0.5 mg po hs.  Monitor fall elopement and fall risk.    Current medications:  Current Facility-Administered Medications   Medication Dose Route Frequency Provider Last Rate    acetaminophen  650 mg Oral Q4H PRN Mary Fernandez MD      acetaminophen  650 mg Oral Q4H PRN Mary Fernandez MD      acetaminophen  975 mg Oral Q6H PRN Mary Fernandez MD      albuterol  2 puff Inhalation Q4H PRN Brad Dailey MD      aluminum-magnesium hydroxide-simethicone  30 mL Oral Q4H PRN Mary Fernandez MD      [START ON 3/6/2025] ARIPiprazole  5 mg Oral Daily Khoi Beltran MD      Artificial Tears  1 drop [facility] Eyes Q12H [facility] Brad Dailey MD      buprenorphine  1 patch Transdermal Q7 Days Brad Dailey MD      cholecalciferol  2,000 Units Oral Daily Mary Fernandez MD      [START ON 3/6/2025] cyanocobalamin  500 mcg Oral Daily Evelin Rosado PA-C      folic acid  1 mg Oral Daily Brad Dailey MD      haloperidol lactate  5 mg Intramuscular Q4H PRN Max 4/day Mary Fernandez MD      hydrOXYzine HCL  25 mg Oral Q6H PRN Max 4/day Mary Fernandez MD      hydrOXYzine HCL  50 mg Oral Q6H PRN Max 4/day Mary Fernandez MD    "   lamoTRIgine  50 mg Oral BID Mary Fernandez MD      LORazepam  0.5 mg Oral Q8H PRN Khoi Beltran MD      [START ON 3/6/2025] LORazepam  0.5 mg Oral BID Khoi Beltran MD      melatonin  3 mg Oral HS Mary Fernandez MD      montelukast  10 mg Oral Daily Brad Dailey MD      multivitamin-minerals  1 tablet Oral Daily Brad Dailey MD      nicotine  1 patch Transdermal Daily Brad Dailey MD      nicotine polacrilex  4 mg Oral Q2H PRN Mary Fernandez MD      OLANZapine  5 mg Oral TID PRN Khoi Beltran MD      propranolol  5 mg Oral Q8H PRN Mary Fernandez MD      risperiDONE  0.5 mg Oral Q4H PRN Max 6/day Khoi Beltran MD      risperiDONE  1 mg Oral Q4H PRN Max 3/day Khoi Beltran MD      thiamine  100 mg Oral Daily Brad Dailey MD      traZODone  50 mg Oral HS PRN Khoi Beltran MD      traZODone  50 mg Oral HS Khoi Beltran MD         Risks / Benefits of Treatment:    Risks, benefits, and possible side effects of medications explained to patient. Patient has limited understanding of risks and benefits of treatment at this time, but agrees to take medications as prescribed.    Subjective:    Behavior over the last 24 hours: limited improvement    Brisa was seen for continuing care. Patient appears less confused and more organized in her thoughts this morning. States she is in the hospital  \"to get help\"  and is able to name Abilify as medication she was taking before. She remains suspicious, anxious and preoccupied but has been adherence to her meds. Discussed her current clinical presentation and her need to stay in the unit for further treatment. She verbalized understanding and signed 201 from. No acute focal neurological deficit or change in mental status noted at this time.     Sleep: broken sleep  Appetite: fair  Medication side effects: No   ROS: all other systems are negative, except chronic pain    Mental Status Evaluation:    Appearance:  age appropriate   Behavior:  slightly less guarded   Speech:  " decreased rate, soft   Mood:  anxious   Affect:  constricted   Thought Process:  less disorganized   Associations: circumstantial associations   Thought Content:  some paranoia   Perceptual Disturbances: appears preoccupied   Risk Potential: Suicidal ideation - None at present  Homicidal ideation - None at present  Potential for aggression - Not at present   Sensorium:  oriented to person and place   Memory:  recent and remote memory: unable to assess due to lack of cooperation   Consciousness:  alert and awake   Attention/Concentration: attention span and concentration appear shorter than expected for age   Insight:  poor   Judgment: limited   Gait/Station: normal gait/station   Motor Activity: no abnormal movements     Vital signs in last 24 hours:    Temp:  [97.7 °F (36.5 °C)] 97.7 °F (36.5 °C)  HR:  [101] 101  BP: (131-151)/(55-68) 151/55  Resp:  [18-20] 20  SpO2:  [91 %-95 %] 91 %         Laboratory results: I have personally reviewed all pertinent laboratory/tests results    Most Recent Labs:   Lab Results   Component Value Date    WBC 8.15 03/04/2025    RBC 4.92 03/04/2025    HGB 14.3 03/04/2025    HCT 44.4 03/04/2025     03/04/2025    RDW 12.8 03/04/2025    NEUTROABS 5.07 03/04/2025    SODIUM 140 03/04/2025    K 3.9 03/04/2025     03/04/2025    CO2 29 03/04/2025    BUN 14 03/04/2025    CREATININE 0.77 03/04/2025    GLUC 80 03/04/2025    CALCIUM 9.2 03/04/2025    AST 19 03/04/2025    ALT 18 03/04/2025    ALKPHOS 46 03/04/2025    TP 6.5 03/04/2025    ALB 3.9 03/04/2025    TBILI 0.51 03/04/2025    CHOLESTEROL 146 03/04/2025    HDL 37 (L) 03/04/2025    TRIG 128 03/04/2025    LDLCALC 83 03/04/2025    NONHDLC 109 03/04/2025    ZSF5VYORYSCK 0.326 (L) 03/02/2025    FREET4 0.97 03/02/2025    TREPONEMAPA Non-reactive 03/04/2025    HGBA1C 5.8 (H) 01/21/2025     01/21/2025       Counseling / Coordination of Care:    Patient's progress discussed with staff in treatment team meeting.  Supportive  therapy provided to patient.  Group attendance encouraged.    Khoi Beltran MD 03/05/25

## 2025-03-05 NOTE — NURSING NOTE
Patient cooperative and med compliant, withdrawn to her room this evening. She denies all psychiatric symptoms. Trazodone 50 mg given at 2050. Safety precautions maintained q15 mins.

## 2025-03-05 NOTE — PROGRESS NOTES
"Progress Note - Brisa Nick 59 y.o. female MRN: [id number]    Unit/Bed#: [facility]01 Encounter: [id number]        Subjective:   Patient seen and examined at bedside after reviewing the chart and discussing the case with the caring staff.      Patient examined at bedside.  Patient has no acute symptoms.    Labs 3/5: vit b12 314 otherwise labs without significant abnormalities.     Physical Exam   Vitals: Blood pressure 151/55, pulse 101, temperature 97.7 °F (36.5 °C), temperature source Temporal, resp. rate 20, height 5' 3\" (1.6 m), weight 74.1 kg (163 lb 6.4 oz), SpO2 91%.,Body mass index is 28.95 kg/m².  Constitutional: Patient in no acute distress.  HEENT: PERR, EOMI, MMM.  Cardiovascular: Normal rate and regular rhythm.    Pulmonary/Chest: Effort normal and breath sounds normal.   Abdomen: Soft, + BS, NT.    Assessment/Plan:  Brisa Nick is a(n) 59 y.o. year old female with bipolar affective disorder.     Asthma.  Patient is on Singulair 10 mg daily.  Albuterol inhaler as needed.  Chronic pain syndrome.  Involving lower back pain and bilateral hips.  Patient follows with pain management on outpt basis and on Butrans patch weekly.  Tylenol as needed.  Nicotine addiction.  NRT.  Alcohol abuse.  Patient started thiamine, folic acid and multivitamin.  Insomnia.  Patient is on melatonin 3 mg at bedtime.  Vitamin D deficiency.  Continue vitamin D supplement.  Dry eyes.  Patient is on artificial tears twice daily.  Vitamin B12 deficiency.  Pt started on vitamin B12 500 mcg daily.     The patient was discussed with Dr. Dailey and he is in agreement with the above note.  "

## 2025-03-05 NOTE — PLAN OF CARE
Problem: Alteration in Thoughts and Perception  Goal: Agree to be compliant with medication regime, as prescribed and report medication side effects  Description: Interventions:  - Offer appropriate PRN medication and supervise ingestion; conduct AIMS, as needed   Outcome: Progressing     Problem: Alteration in Thoughts and Perception  Goal: Recognize dysfunctional thoughts, communicate reality-based thoughts at the time of discharge  Description: Interventions:  - Provide medication and psycho-education to assist patient in compliance and developing insight into his/her illness   Outcome: Not Progressing     Problem: Depression  Goal: Refrain from isolation  Description: Interventions:  - Develop a trusting relationship   - Encourage socialization   Outcome: Not Progressing     Problem: Ineffective Coping  Goal: Cooperates with admission process  Description: Interventions:   - Complete admission process  Outcome: Completed

## 2025-03-06 PROBLEM — E44.0 MODERATE PROTEIN-CALORIE MALNUTRITION (HCC): Status: ACTIVE | Noted: 2025-03-06

## 2025-03-06 PROCEDURE — 99232 SBSQ HOSP IP/OBS MODERATE 35: CPT | Performed by: PSYCHIATRY & NEUROLOGY

## 2025-03-06 RX ORDER — LAMOTRIGINE 25 MG/1
75 TABLET ORAL 2 TIMES DAILY
Status: DISCONTINUED | OUTPATIENT
Start: 2025-03-06 | End: 2025-03-07

## 2025-03-06 RX ADMIN — Medication 3 MG: at 21:29

## 2025-03-06 RX ADMIN — CYANOCOBALAMIN TAB 500 MCG 500 MCG: 500 TAB at 08:15

## 2025-03-06 RX ADMIN — NICOTINE 1 PATCH: 14 PATCH, EXTENDED RELEASE TRANSDERMAL at 08:19

## 2025-03-06 RX ADMIN — FOLIC ACID 1 MG: 1 TABLET ORAL at 08:14

## 2025-03-06 RX ADMIN — LORAZEPAM 0.5 MG: 0.5 TABLET ORAL at 21:28

## 2025-03-06 RX ADMIN — THIAMINE HCL TAB 100 MG 100 MG: 100 TAB at 08:14

## 2025-03-06 RX ADMIN — LAMOTRIGINE 50 MG: 25 TABLET ORAL at 08:15

## 2025-03-06 RX ADMIN — CHOLECALCIFEROL TAB 25 MCG (1000 UNIT) 2000 UNITS: 25 TAB at 08:14

## 2025-03-06 RX ADMIN — MONTELUKAST 10 MG: 10 TABLET, FILM COATED ORAL at 08:14

## 2025-03-06 RX ADMIN — TRAZODONE HYDROCHLORIDE 50 MG: 50 TABLET ORAL at 21:29

## 2025-03-06 RX ADMIN — Medication 1 TABLET: at 08:15

## 2025-03-06 RX ADMIN — ARIPIPRAZOLE 5 MG: 5 TABLET ORAL at 08:14

## 2025-03-06 NOTE — NURSING NOTE
Patient mostly withdrawn to room, out for meals, attends some group therapies and cooperative. Patient has high anxiety/depression, denies SI/HI and hallucinations. Continued care and safety rounds in progress.

## 2025-03-06 NOTE — NURSING NOTE
Patient noted to be visible and social on the milieu, otherwise mostly withdrawn to self. Alert to self only and disorganized in thought. Denied SI/HI/AVH. Endorsed anxiety and depression. Required much encouragement to take medications due to confusion. Q 15 min safety checks continuous and maintained.

## 2025-03-06 NOTE — PROGRESS NOTES
Progress Note - Behavioral Health   Name: Brisa Nick 59 y.o. female I MRN: [id number]   Unit/Bed#: [facility]01 I Date of Admission: 3/3/2025   Date of Service: 3/6/2025 I Hospital Day: 3         Assessment & Plan  Degenerative disc disease, cervical    Migraine headache    Fibromyalgia    Posttraumatic stress disorder    Crohn's colitis (HCC)    GERD (gastroesophageal reflux disease)    Bipolar affective disorder, depressed, severe, with psychotic behavior (HCC)        Recommended Treatment:     Planned medication and treatment changes:    All current active medications have been reviewed  Encourage group therapy, milieu therapy and occupational therapy  Behavioral Health checks for safety monitoring  Legal status: 201   Increase Lamictal 75 mg po bid.  Abilify 5 mg po daily.  Trazodone 50 mg po hs.  Ativan 0.5 mg po hs.  Monitor fall elopement and fall risk.    Current medications:  Current Facility-Administered Medications   Medication Dose Route Frequency Provider Last Rate    acetaminophen  650 mg Oral Q4H PRN Mary Fernandez MD      acetaminophen  650 mg Oral Q4H PRN Mary Fernandez MD      acetaminophen  975 mg Oral Q6H PRN Mary Fernandez MD      albuterol  2 puff Inhalation Q4H PRN Brad Dailey MD      aluminum-magnesium hydroxide-simethicone  30 mL Oral Q4H PRN Mary Fernandez MD      ARIPiprazole  5 mg Oral Daily Khoi Beltran MD      Artificial Tears  1 drop Both Eyes Q12H [facility] Brad Dailey MD      buprenorphine  1 patch Transdermal Q7 Days Brad Dailey MD      cholecalciferol  2,000 Units Oral Daily Mary Fernandez MD      cyanocobalamin  500 mcg Oral Daily Evelin Rosado PA-C      folic acid  1 mg Oral Daily Brad Dailey MD      haloperidol lactate  5 mg Intramuscular Q4H PRN Max 4/day Mary Fernandez MD      hydrOXYzine HCL  25 mg Oral Q6H PRN Max 4/day Mary Fernandez MD      hydrOXYzine HCL  50 mg Oral Q6H PRN Max 4/day Mary Fernandez MD      lamoTRIgine  50 mg Oral BID  "Mary Fernandez MD      LORazepam  0.5 mg Oral Q8H PRN Khoi Beltran MD      LORazepam  0.5 mg Oral HS Khoi Beltran MD      melatonin  3 mg Oral HS Mary Fernandez MD      montelukast  10 mg Oral Daily Brad Dailey MD      multivitamin-minerals  1 tablet Oral Daily Brad Dailey MD      nicotine  1 patch Transdermal Daily Brad Dailey MD      nicotine polacrilex  4 mg Oral Q2H PRN Mary Fernandez MD      OLANZapine  5 mg Oral TID PRN Khoi Beltran MD      propranolol  5 mg Oral Q8H PRN Mary Fernandez MD      risperiDONE  0.5 mg Oral Q4H PRN Max 6/day Khoi Beltran MD      risperiDONE  1 mg Oral Q4H PRN Max 3/day Khoi Beltran MD      thiamine  100 mg Oral Daily Brad Dailey MD      traZODone  50 mg Oral HS PRN Khoi Beltran MD      traZODone  50 mg Oral HS Khoi Beltran MD         Risks / Benefits of Treatment:    Risks, benefits, and possible side effects of medications explained to patient. Patient has limited understanding of risks and benefits of treatment at this time, but agrees to take medications as prescribed.    Subjective:    Behavior over the last 24 hours: minimal improvement    Brisa was seen for continuing care. Patient appears more organized in her speech and thoughts process but remains anxious and suspicious. States she feels \"not well\" but took her Abilify this morning. She remains anxious and preoccupied but was noted to be participating in groups this morning.  She has been adherence to her meds and denies any current side effects. Staff reports pt is able to sit in groups but continues with limited insight into her mental illness. No acute focal neurological deficit or change in mental status noted at this time. .     Sleep: slept better   Appetite: fair  Medication side effects: No   ROS: all other systems are negative, except chronic pain    Mental Status Evaluation:    Appearance:  age appropriate, adequate grooming   Behavior:  less guarded   Speech:  decreased rate, soft   Mood:  " anxious   Affect:  constricted   Thought Process:  less disorganized   Associations: circumstantial associations   Thought Content:  mild paranoia   Perceptual Disturbances: appears preoccupied   Risk Potential: Suicidal ideation - None at present  Homicidal ideation - None at present  Potential for aggression - Not at present   Sensorium:  oriented to person and place   Memory:  recent and remote memory: unable to assess due to lack of cooperation   Consciousness:  alert and awake   Attention/Concentration: attention span and concentration appear shorter than expected for age   Insight:  poor   Judgment: limited   Gait/Station: normal gait/station   Motor Activity: no abnormal movements     Vital signs in last 24 hours:    Temp:  [97.5 °F (36.4 °C)-97.6 °F (36.4 °C)] 97.5 °F (36.4 °C)  HR:  [] 75  BP: (121-137)/(62-82) 137/65  Resp:  [18] 18  SpO2:  [90 %-95 %] 95 %  O2 Device: None (Room air)         Laboratory results: I have personally reviewed all pertinent laboratory/tests results    Most Recent Labs:   Lab Results   Component Value Date    WBC 8.15 03/04/2025    RBC 4.92 03/04/2025    HGB 14.3 03/04/2025    HCT 44.4 03/04/2025     03/04/2025    RDW 12.8 03/04/2025    NEUTROABS 5.07 03/04/2025    SODIUM 140 03/04/2025    K 3.9 03/04/2025     03/04/2025    CO2 29 03/04/2025    BUN 14 03/04/2025    CREATININE 0.77 03/04/2025    GLUC 80 03/04/2025    CALCIUM 9.2 03/04/2025    AST 19 03/04/2025    ALT 18 03/04/2025    ALKPHOS 46 03/04/2025    TP 6.5 03/04/2025    ALB 3.9 03/04/2025    TBILI 0.51 03/04/2025    CHOLESTEROL 146 03/04/2025    HDL 37 (L) 03/04/2025    TRIG 128 03/04/2025    LDLCALC 83 03/04/2025    NONHDLC 109 03/04/2025    [id number] 0.326 (L) 03/02/2025    FREET4 0.97 03/02/2025    TREPONEMAPA Non-reactive 03/04/2025    HGBA1C 5.8 (H) 01/21/2025     01/21/2025       Counseling / Coordination of Care:    Patient's progress discussed with staff in treatment team  meeting.  Medication changes reviewed with staff in treatment team meeting.  Supportive therapy provided to patient.  Group attendance encouraged.    Khoi Beltran MD 03/06/25

## 2025-03-06 NOTE — NURSING NOTE
Received patient at 2300.  No current issues noted.  Sleeping well at present.  Maintained on q 15 minute checks.  Fluids at bedside to promote hydration. Will continue to monitor.

## 2025-03-06 NOTE — NURSING NOTE
Patient appears to have slept (uninterrupted) overnight. No respiratory distress observed while sleeping. No acute behaviors this shift. Maintained on Q 15 min safety checks. Fall safety precautions remain in place.  Fluids at bedside to promote hydration.

## 2025-03-06 NOTE — PROGRESS NOTES
03/06/25 0926   Activity/Group Checklist   Group Admission/Discharge   Attendance Attended   Attendance Duration (min) 0-15   Interactions Interacted appropriately   Affect/Mood Appropriate  (some confusion)   Goals Achieved Identified feelings;Discussed coping strategies;Able to listen to others;Able to engage in interactions;Able to manage/cope with feelings;Able to self-disclose;Able to recieve feedback     Patient agreeable to meet and complete self assessment with [facility]S.  Patient information can be found in media.

## 2025-03-06 NOTE — PLAN OF CARE
Problem: Alteration in Thoughts and Perception  Goal: Agree to be compliant with medication regime, as prescribed and report medication side effects  Description: Interventions:  - Offer appropriate PRN medication and supervise ingestion; conduct AIMS, as needed   Outcome: Progressing  Goal: Attend and participate in unit activities, including therapeutic, recreational, and educational groups  Description: Interventions:  -Encourage Visitation and family involvement in care  Outcome: Progressing     Problem: Risk for Self Injury/Neglect  Goal: Refrain from harming self  Description: Interventions:  - Monitor patient closely, per order  - Develop a trusting relationship  - Supervise medication ingestion, monitor effects and side effects   Outcome: Progressing     Problem: Anxiety  Goal: Anxiety is at manageable level  Description: Interventions:  - Assess and monitor patient's anxiety level.   - Monitor for signs and symptoms (heart palpitations, chest pain, shortness of breath, headaches, nausea, feeling jumpy, restlessness, irritable, apprehensive).   - Collaborate with interdisciplinary team and initiate plan and interventions as ordered.  - [place] patient to unit/surroundings  - Explain treatment plan  - Encourage participation in care  - Encourage verbalization of concerns/fears  - Identify coping mechanisms  - Assist in developing anxiety-reducing skills  - Administer/offer alternative therapies  - Limit or eliminate stimulants  Outcome: Progressing

## 2025-03-06 NOTE — SOCIAL WORK
RANDAL spoke to pt daughter Meeta [phone].  Reviewed tx plans and goal. Meeta verbalized understanding and in agreement.  RANDAL and Meeta to continue collaboration during pt tx stay.

## 2025-03-06 NOTE — PROGRESS NOTES
"Progress Note - Brisa Nick 59 y.o. female MRN: [id number]    Unit/Bed#: [facility] 205-01 Encounter: [id number]        Subjective:   Patient seen and examined at bedside after reviewing the chart and discussing the case with the caring staff.      Patient examined at bedside.  Patient complaining of feeling lightheaded with position changes.  She states this started 2-3 days ago but worse today.  Pt counseled on hydration and slow position changes.  Orthostatics ordered.     CBC, CMP ordered for AM.     Physical Exam   Vitals: Blood pressure 137/65, pulse 75, temperature 97.5 °F (36.4 °C), temperature source Temporal, resp. rate 18, height 5' 3\" (1.6 m), weight 74.1 kg (163 lb 6.4 oz), SpO2 95%.,Body mass index is 28.95 kg/m².  Constitutional: Patient in no acute distress.  HEENT: PERR, EOMI, MMM.  Cardiovascular: Normal rate and regular rhythm.    Pulmonary/Chest: Effort normal and breath sounds normal.   Abdomen: Soft, + BS, NT.    Assessment/Plan:  Brisa Nick is a(n) 59 y.o. year old female with bipolar affective disorder.     Asthma.  Patient is on Singulair 10 mg daily.  Albuterol inhaler as needed.  Chronic pain syndrome.  Involving lower back pain and bilateral hips.  Patient follows with pain management on outpt basis and on Butrans patch weekly.  Tylenol as needed.  Nicotine addiction.  NRT.  Alcohol abuse.  Patient started thiamine, folic acid and multivitamin.  Insomnia.  Patient is on melatonin 3 mg at bedtime.  Vitamin D deficiency.  Continue vitamin D supplement.  Dry eyes.  Patient is on artificial tears twice daily.  Vitamin B12 deficiency.  Pt started on vitamin B12 500 mcg daily.     The patient was discussed with Dr. Dailey and he is in agreement with the above note.  "

## 2025-03-06 NOTE — PROGRESS NOTES
03/06/25    Team Meeting   Meeting Type Daily Rounds   Team Members Present   Team Members Present Physician;Nurse;;Occupational Therapist   Physician Team Member Ruby Fierro MD   Nursing Team Member Ernie WATERS   Social Work Team Member Horacio CALVIN   OT Team Member Gino HAND   Patient/Family Present   Patient Present No   Patient's Family Present No   201, high anx/dep, paranoid about medications, less confused, wanders, less disorganized, reality tests, improvements, no d/c date due to monitoring and med adjustments

## 2025-03-07 LAB
ALBUMIN SERPL BCG-MCNC: 3.9 G/DL (ref 3.5–5)
ALP SERPL-CCNC: 49 U/L (ref 34–104)
ALT SERPL W P-5'-P-CCNC: 16 U/L (ref 7–52)
ANION GAP SERPL CALCULATED.3IONS-SCNC: 6 MMOL/L (ref 4–13)
AST SERPL W P-5'-P-CCNC: 16 U/L (ref 13–39)
BASOPHILS # BLD AUTO: 0.07 THOUSANDS/ÂΜL (ref 0–0.1)
BASOPHILS NFR BLD AUTO: 1 % (ref 0–1)
BILIRUB SERPL-MCNC: 0.37 MG/DL (ref 0.2–1)
BUN SERPL-MCNC: 19 MG/DL (ref 5–25)
CALCIUM SERPL-MCNC: 9.4 MG/DL (ref 8.4–10.2)
CHLORIDE SERPL-SCNC: 102 MMOL/L (ref 96–108)
CO2 SERPL-SCNC: 31 MMOL/L (ref 21–32)
CREAT SERPL-MCNC: 0.72 MG/DL (ref 0.6–1.3)
EOSINOPHIL # BLD AUTO: 0.4 THOUSAND/ÂΜL (ref 0–0.61)
EOSINOPHIL NFR BLD AUTO: 5 % (ref 0–6)
ERYTHROCYTE [DISTWIDTH] IN BLOOD BY AUTOMATED COUNT: 12.4 % (ref 11.6–15.1)
GFR SERPL CREATININE-BSD FRML MDRD: 90 ML/MIN/1.73SQ M
GLUCOSE P FAST SERPL-MCNC: 101 MG/DL (ref 65–99)
GLUCOSE SERPL-MCNC: 101 MG/DL (ref 65–140)
HCT VFR BLD AUTO: 42.6 % (ref 34.8–46.1)
HGB BLD-MCNC: 13.7 G/DL (ref 11.5–15.4)
IMM GRANULOCYTES # BLD AUTO: 0.03 THOUSAND/UL (ref 0–0.2)
IMM GRANULOCYTES NFR BLD AUTO: 0 % (ref 0–2)
LYMPHOCYTES # BLD AUTO: 2.15 THOUSANDS/ÂΜL (ref 0.6–4.47)
LYMPHOCYTES NFR BLD AUTO: 25 % (ref 14–44)
MCH RBC QN AUTO: 29 PG (ref 26.8–34.3)
MCHC RBC AUTO-ENTMCNC: 32.2 G/DL (ref 31.4–37.4)
MCV RBC AUTO: 90 FL (ref 82–98)
MONOCYTES # BLD AUTO: 0.77 THOUSAND/ÂΜL (ref 0.17–1.22)
MONOCYTES NFR BLD AUTO: 9 % (ref 4–12)
NEUTROPHILS # BLD AUTO: 5.25 THOUSANDS/ÂΜL (ref 1.85–7.62)
NEUTS SEG NFR BLD AUTO: 60 % (ref 43–75)
NRBC BLD AUTO-RTO: 0 /100 WBCS
PLATELET # BLD AUTO: 267 THOUSANDS/UL (ref 149–390)
PMV BLD AUTO: 10.9 FL (ref 8.9–12.7)
POTASSIUM SERPL-SCNC: 4 MMOL/L (ref 3.5–5.3)
PROT SERPL-MCNC: 6.2 G/DL (ref 6.4–8.4)
RBC # BLD AUTO: 4.73 MILLION/UL (ref 3.81–5.12)
SODIUM SERPL-SCNC: 139 MMOL/L (ref 135–147)
WBC # BLD AUTO: 8.67 THOUSAND/UL (ref 4.31–10.16)

## 2025-03-07 PROCEDURE — 80053 COMPREHEN METABOLIC PANEL: CPT

## 2025-03-07 PROCEDURE — 85025 COMPLETE CBC W/AUTO DIFF WBC: CPT

## 2025-03-07 PROCEDURE — 99232 SBSQ HOSP IP/OBS MODERATE 35: CPT | Performed by: PSYCHIATRY & NEUROLOGY

## 2025-03-07 RX ORDER — LAMOTRIGINE 100 MG/1
100 TABLET ORAL 2 TIMES DAILY
Status: DISCONTINUED | OUTPATIENT
Start: 2025-03-07 | End: 2025-03-10

## 2025-03-07 RX ADMIN — LAMOTRIGINE 100 MG: 100 TABLET ORAL at 17:28

## 2025-03-07 RX ADMIN — Medication 1 TABLET: at 08:28

## 2025-03-07 RX ADMIN — DEXTRAN 70, GLYCERIN, HYPROMELLOSE 1 DROP: 1; 2; 3 SOLUTION/ DROPS OPHTHALMIC at 21:54

## 2025-03-07 RX ADMIN — FOLIC ACID 1 MG: 1 TABLET ORAL at 08:28

## 2025-03-07 RX ADMIN — LAMOTRIGINE 75 MG: 25 TABLET ORAL at 08:28

## 2025-03-07 RX ADMIN — TRAZODONE HYDROCHLORIDE 50 MG: 50 TABLET ORAL at 21:54

## 2025-03-07 RX ADMIN — CHOLECALCIFEROL TAB 25 MCG (1000 UNIT) 2000 UNITS: 25 TAB at 08:28

## 2025-03-07 RX ADMIN — ARIPIPRAZOLE 5 MG: 5 TABLET ORAL at 08:27

## 2025-03-07 RX ADMIN — LORAZEPAM 0.5 MG: 0.5 TABLET ORAL at 21:54

## 2025-03-07 RX ADMIN — Medication 3 MG: at 21:54

## 2025-03-07 RX ADMIN — ALUMINUM HYDROXIDE, MAGNESIUM HYDROXIDE, AND DIMETHICONE 30 ML: 200; 20; 200 SUSPENSION ORAL at 03:17

## 2025-03-07 RX ADMIN — THIAMINE HCL TAB 100 MG 100 MG: 100 TAB at 08:28

## 2025-03-07 RX ADMIN — CYANOCOBALAMIN TAB 500 MCG 500 MCG: 500 TAB at 08:28

## 2025-03-07 RX ADMIN — MONTELUKAST 10 MG: 10 TABLET, FILM COATED ORAL at 08:28

## 2025-03-07 NOTE — PROGRESS NOTES
03/07/2025   Team Meeting   Meeting Type Daily Rounds   Team Members Present   Team Members Present Physician;Nurse;;Occupational Therapist   Physician Team Member Ruby ORDOÑEZ   Nursing Team Member Lokesh WATERS   Social Work Team Member Horacio CALVIN   OT Team Member Gino HAND   Patient/Family Present   Patient Present No   Patient's Family Present No   201, slept, states she is feeling better, paranoid, suspicious however accept redirection/assurance, med compliant, disorganized, med adjustments, less anxious, no d/c date med monitor

## 2025-03-07 NOTE — NURSING NOTE
Received patient at 1900. Social with peers and out in the community (watching television).  Feels safe on unit. Rated anxiety as high and depression as moderate. Denied suicidal and/or homicidal ideations. Disorganized at times. Compliant with medications and snack.  No aspiration risks noted.  Fluids at bedside to promote hydration.  Maintained on q 15 minute checks.  Medication education given.  Care Plan reviewed and amended. Will continue to monitor.

## 2025-03-07 NOTE — ASSESSMENT & PLAN NOTE
Malnutrition Findings:   Adult Malnutrition type: Chronic illness  Adult Degree of Malnutrition: Malnutrition of moderate degree  Malnutrition Characteristics: Inadequate energy, Weight loss  360 Statement: Malnutrition related to inadequate energy intake as evidenced by 34#(17%) weight loss from 9/10/# to 3/3/# and <75% energy intake compared to estimated needs > 1 month.    BMI Findings:      Body mass index is 28.95 kg/m².

## 2025-03-07 NOTE — NURSING NOTE
Patient observed sleeping during shift. Awake once for at 0317 for   aluminum-magnesium hydroxide-simethicone (MAALOX) oral suspension 30 mL for complaint of indigestion. No acute behaviors noted.  No change in medical condition.  Maintained on q 15 minute safety checks. Will continue to monitor.

## 2025-03-07 NOTE — PROGRESS NOTES
"Progress Note - Brisa Nick 59 y.o. female MRN: [id number]    Unit/Bed#: [facility] 205-01 Encounter: [id number]        Subjective:   Patient seen and examined at bedside after reviewing the chart and discussing the case with the caring staff.      Patient examined at bedside.  Patient without acute symptoms.      Awaiting orthostatic BPs.   Labs 3/7:  CBC, CMP without significant abnormalities.     Physical Exam   Vitals: Blood pressure 118/71, pulse 87, temperature 97.6 °F (36.4 °C), temperature source Temporal, resp. rate 16, height 5' 3\" (1.6 m), weight 74.1 kg (163 lb 6.4 oz), SpO2 92%.,Body mass index is 28.95 kg/m².  Constitutional: Patient in no acute distress.  HEENT: PERR, EOMI, MMM.  Cardiovascular: Normal rate and regular rhythm.    Pulmonary/Chest: Effort normal and breath sounds normal.   Abdomen: Soft, + BS, NT.    Assessment/Plan:  Brisa Nick is a(n) 59 y.o. year old female with bipolar affective disorder.     Asthma.  Patient is on Singulair 10 mg daily.  Albuterol inhaler as needed.  Chronic pain syndrome.  Involving lower back pain and bilateral hips.  Patient follows with pain management on outpt basis and on Butrans patch weekly.  Tylenol as needed.  Nicotine addiction.  NRT.  Alcohol abuse.  Patient started thiamine, folic acid and multivitamin.  Insomnia.  Patient is on melatonin 3 mg at bedtime.  Vitamin D deficiency.  Continue vitamin D supplement.  Dry eyes.  Patient is on artificial tears twice daily.  Vitamin B12 deficiency.  Pt started on vitamin B12 500 mcg daily.     The patient was discussed with Dr. Dailey and he is in agreement with the above note.  "

## 2025-03-07 NOTE — PLAN OF CARE
Problem: Alteration in Thoughts and Perception  Goal: Treatment Goal: Gain control of psychotic behaviors/thinking, reduce/eliminate presenting symptoms and demonstrate improved reality functioning upon discharge  Outcome: Progressing  Goal: Verbalize thoughts and feelings  Description: Interventions:  - Promote a nonjudgmental and trusting relationship with the patient through active listening and therapeutic communication  - Assess patient's level of functioning, behavior and potential for risk  - Engage patient in 1 on 1 interactions  - Encourage patient to express fears, feelings, frustrations, and discuss symptoms    - [place] patient to reality, help patient recognize reality-based thinking   - Administer medications as ordered and assess for potential side effects  - Provide the patient education related to the signs and symptoms of the illness and desired effects of prescribed medications  Outcome: Progressing  Goal: Refrain from acting on delusional thinking/internal stimuli  Description: Interventions:  - Monitor patient closely, per order   - Utilize least restrictive measures   - Set reasonable limits, give positive feedback for acceptable   - Administer medications as ordered and monitor of potential side effects  Outcome: Progressing  Goal: Agree to be compliant with medication regime, as prescribed and report medication side effects  Description: Interventions:  - Offer appropriate PRN medication and supervise ingestion; conduct AIMS, as needed   Outcome: Progressing  Goal: Attend and participate in unit activities, including therapeutic, recreational, and educational groups  Description: Interventions:  -Encourage Visitation and family involvement in care  Outcome: Progressing  Goal: Recognize dysfunctional thoughts, communicate reality-based thoughts at the time of discharge  Description: Interventions:  - Provide medication and psycho-education to assist patient in compliance and developing  insight into his/her illness   Outcome: Progressing  Goal: Complete daily ADLs, including personal hygiene independently, as able  Description: Interventions:  - Observe, teach, and assist patient with ADLS  - Monitor and promote a balance of rest/activity, with adequate nutrition and elimination   Outcome: Progressing     Problem: Risk for Self Injury/Neglect  Goal: Treatment Goal: Remain safe during length of stay, learn and adopt new coping skills, and be free of self-injurious ideation, impulses and acts at the time of discharge  Outcome: Progressing  Goal: Verbalize thoughts and feelings  Description: Interventions:  - Assess and re-assess patient's lethality and potential for self-injury  - Engage patient in 1:1 interactions, daily, for a minimum of 15 minutes  - Encourage patient to express feelings, fears, frustrations, hopes  - Establish rapport/trust with patient   Outcome: Progressing  Goal: Refrain from harming self  Description: Interventions:  - Monitor patient closely, per order  - Develop a trusting relationship  - Supervise medication ingestion, monitor effects and side effects   Outcome: Progressing  Goal: Attend and participate in unit activities, including therapeutic, recreational, and educational groups  Description: Interventions:  - Provide therapeutic and educational activities daily, encourage attendance and participation, and document same in the medical record  - Obtain collateral information, encourage visitation and family involvement in care   Outcome: Progressing  Goal: Recognize maladaptive responses and adopt new coping mechanisms  Outcome: Progressing  Goal: Complete daily ADLs, including personal hygiene independently, as able  Description: Interventions:  - Observe, teach, and assist patient with ADLS  - Monitor and promote a balance of rest/activity, with adequate nutrition and elimination  Outcome: Progressing     Problem: Depression  Goal: Treatment Goal: Demonstrate  behavioral control of depressive symptoms, verbalize feelings of improved mood/affect, and adopt new coping skills prior to discharge  Outcome: Progressing  Goal: Verbalize thoughts and feelings  Description: Interventions:  - Assess and re-assess patient's level of risk   - Engage patient in 1:1 interactions, daily, for a minimum of 15 minutes   - Encourage patient to express feelings, fears, frustrations, hopes   Outcome: Progressing  Goal: Refrain from harming self  Description: Interventions:  - Monitor patient closely, per order   - Supervise medication ingestion, monitor effects and side effects   Outcome: Progressing  Goal: Refrain from isolation  Description: Interventions:  - Develop a trusting relationship   - Encourage socialization   Outcome: Progressing  Goal: Refrain from self-neglect  Outcome: Progressing  Goal: Attend and participate in unit activities, including therapeutic, recreational, and educational groups  Description: Interventions:  - Provide therapeutic and educational activities daily, encourage attendance and participation, and document same in the medical record   Outcome: Progressing  Goal: Complete daily ADLs, including personal hygiene independently, as able  Description: Interventions:  - Observe, teach, and assist patient with ADLS  -  Monitor and promote a balance of rest/activity, with adequate nutrition and elimination   Outcome: Progressing     Problem: Anxiety  Goal: Anxiety is at manageable level  Description: Interventions:  - Assess and monitor patient's anxiety level.   - Monitor for signs and symptoms (heart palpitations, chest pain, shortness of breath, headaches, nausea, feeling jumpy, restlessness, irritable, apprehensive).   - Collaborate with interdisciplinary team and initiate plan and interventions as ordered.  - [place] patient to unit/surroundings  - Explain treatment plan  - Encourage participation in care  - Encourage verbalization of concerns/fears  - Identify  coping mechanisms  - Assist in developing anxiety-reducing skills  - Administer/offer alternative therapies  - Limit or eliminate stimulants  Outcome: Progressing     Problem: Risk for Violence/Aggression Toward Others  Goal: Treatment Goal: Refrain from acts of violence/aggression during length of stay, and demonstrate improved impulse control at the time of discharge  Outcome: Progressing  Goal: Verbalize thoughts and feelings  Description: Interventions:  - Assess and re-assess patient's level of risk, every waking shift  - Engage patient in 1:1 interactions, daily, for a minimum of 15 minutes   - Allow patient to express feelings and frustrations in a safe and non-threatening manner   - Establish rapport/trust with patient   Outcome: Progressing  Goal: Refrain from harming others  Outcome: Progressing  Goal: Refrain from destructive acts on the environment or property  Outcome: Progressing  Goal: Control angry outbursts  Description: Interventions:  - Monitor patient closely, per order  - Ensure early verbal de-escalation  - Monitor prn medication needs  - Set reasonable/therapeutic limits, outline behavioral expectations, and consequences   - Provide a non-threatening milieu, utilizing the least restrictive interventions   Outcome: Progressing  Goal: Attend and participate in unit activities, including therapeutic, recreational, and educational groups  Description: Interventions:  - Provide therapeutic and educational activities daily, encourage attendance and participation, and document same in the medical record   Outcome: Progressing  Goal: Identify appropriate positive anger management techniques  Description: Interventions:  - Offer anger management and coping skills groups   - Staff will provide positive feedback for appropriate anger control  Outcome: Progressing     Problem: Alteration in Orientation  Goal: Treatment Goal: Demonstrate a reduction of confusion and improved orientation to person, place,  time and/or situation upon discharge, according to optimum baseline/ability  Outcome: Progressing  Goal: Interact with staff daily  Description: Interventions:  - Assess and re-assess patient's level of orientation  - Engage patient in 1 on 1 interactions, daily, for a minimum of 15 minutes   - Establish rapport/trust with patient   Outcome: Progressing  Goal: Express concerns related to confused thinking related to:  Description: Interventions:  - Encourage patient to express feelings, fears, frustrations, hopes  - Assign consistent caregivers   - [place]/re-orient patient as needed  - Allow comfort items, as appropriate  - Provide visual cues, signs, etc.   Outcome: Progressing  Goal: Allow medical examinations, as recommended  Description: Interventions:  - Provide physical/neurological exams and/or referrals, per provider   Outcome: Progressing  Goal: Cooperate with recommended testing/procedures  Description: Interventions:  - Determine need for ancillary testing  - Observe for mental status changes  - Implement falls/precaution protocol   Outcome: Progressing  Goal: Attend and participate in unit activities, including therapeutic, recreational, and educational groups  Description: Interventions:  - Provide therapeutic and educational activities daily, encourage attendance and participation, and document same in the medical record   - Provide appropriate opportunities for reminiscence   - Provide a consistent daily routine   - Encourage family contact/visitation   Outcome: Progressing  Goal: Complete daily ADLs, including personal hygiene independently, as able  Description: Interventions:  - Observe, teach, and assist patient with ADLS  Outcome: Progressing     Problem: DISCHARGE PLANNING - CARE MANAGEMENT  Goal: Discharge to post-acute care or home with appropriate resources  Description: INTERVENTIONS:  - Conduct assessment to determine patient/family and health care team treatment goals, and need for  post-acute services based on payer coverage, community resources, and patient preferences, and barriers to discharge  - Address psychosocial, clinical, and financial barriers to discharge as identified in assessment in conjunction with the patient/family and health care team  - Arrange appropriate level of post-acute services according to patient’s   needs and preference and payer coverage in collaboration with the physician and health care team  - Communicate with and update the patient/family, physician, and health care team regarding progress on the discharge plan  - Arrange appropriate transportation to post-acute venues  Outcome: Progressing     Problem: Nutrition/Hydration-ADULT  Goal: Nutrient/Hydration intake appropriate for improving, restoring or maintaining nutritional needs  Description: Monitor and assess patient's nutrition/hydration status for malnutrition. Collaborate with interdisciplinary team and initiate plan and interventions as ordered.  Monitor patient's weight and dietary intake as ordered or per policy. Utilize nutrition screening tool and intervene as necessary. Determine patient's food preferences and provide high-protein, high-caloric foods as appropriate.     INTERVENTIONS:  - Monitor oral intake, urinary output, labs, and treatment plans  - Assess nutrition and hydration status and recommend course of action  - Evaluate amount of meals eaten  - Assist patient with eating if necessary   - Allow adequate time for meals  - Recommend/ encourage appropriate diets, oral nutritional supplements, and vitamin/mineral supplements  - Order, calculate, and assess calorie counts as needed  - Recommend, monitor, and adjust tube feedings and TPN/PPN based on assessed needs  - Assess need for intravenous fluids  - Provide specific nutrition/hydration education as appropriate  - Include patient/family/caregiver in decisions related to nutrition  Outcome: Progressing

## 2025-03-07 NOTE — PLAN OF CARE
Problem: Alteration in Thoughts and Perception  Goal: Attend and participate in unit activities, including therapeutic, recreational, and educational groups  Description: Interventions:  -Encourage Visitation and family involvement in care  Outcome: Progressing     Problem: Ineffective Coping  Goal: Participates in unit activities  Description: Interventions:  - Provide therapeutic environment   - Provide required programming   - Redirect inappropriate behaviors   Outcome: Progressing     Problem: Risk for Self Injury/Neglect  Goal: Attend and participate in unit activities, including therapeutic, recreational, and educational groups  Description: Interventions:  - Provide therapeutic and educational activities daily, encourage attendance and participation, and document same in the medical record  - Obtain collateral information, encourage visitation and family involvement in care   Outcome: Progressing     Problem: Depression  Goal: Attend and participate in unit activities, including therapeutic, recreational, and educational groups  Description: Interventions:  - Provide therapeutic and educational activities daily, encourage attendance and participation, and document same in the medical record   Outcome: Progressing     Problem: Risk for Violence/Aggression Toward Others  Goal: Attend and participate in unit activities, including therapeutic, recreational, and educational groups  Description: Interventions:  - Provide therapeutic and educational activities daily, encourage attendance and participation, and document same in the medical record   Outcome: Progressing     Problem: Alteration in Orientation  Goal: Attend and participate in unit activities, including therapeutic, recreational, and educational groups  Description: Interventions:  - Provide therapeutic and educational activities daily, encourage attendance and participation, and document same in the medical record   - Provide appropriate opportunities  for reminiscence   - Provide a consistent daily routine   - Encourage family contact/visitation   Outcome: Progressing

## 2025-03-07 NOTE — PROGRESS NOTES
Progress Note - Behavioral Health   Name: Brisa Nick 59 y.o. female I MRN: [id number]   Unit/Bed#: [facility] 205-01 I Date of Admission: 3/3/2025   Date of Service: 3/7/2025 I Hospital Day: 4         Assessment & Plan  Degenerative disc disease, cervical    Migraine headache    Fibromyalgia    Posttraumatic stress disorder    Crohn's colitis (HCC)    GERD (gastroesophageal reflux disease)    Bipolar affective disorder, depressed, severe, with psychotic behavior (HCC)    Moderate protein-calorie malnutrition (HCC)  Malnutrition Findings:   Adult Malnutrition type: Chronic illness  Adult Degree of Malnutrition: Malnutrition of moderate degree  Malnutrition Characteristics: Inadequate energy, Weight loss  360 Statement: Malnutrition related to inadequate energy intake as evidenced by 34#(17%) weight loss from 9/10/# to 3/3/# and <75% energy intake compared to estimated needs > 1 month.    BMI Findings:      Body mass index is 28.95 kg/m².         Recommended Treatment:     Planned medication and treatment changes:    All current active medications have been reviewed  Encourage group therapy, milieu therapy and occupational therapy  Behavioral Health checks for safety monitoring  Legal status: 201   Increase Lamictal 100 mg po bid.  Abilify 5 mg po daily.  Trazodone 50 mg po hs.  Ativan 0.5 mg po hs.  Monitor fall elopement and fall risk.    Current medications:  Current Facility-Administered Medications   Medication Dose Route Frequency Provider Last Rate    acetaminophen  650 mg Oral Q4H PRN Mary Fernandez MD      acetaminophen  650 mg Oral Q4H PRN Mary Fernandez MD      acetaminophen  975 mg Oral Q6H PRN Mary Fernandez MD      albuterol  2 puff Inhalation Q4H PRCRISTOBAL Dailey MD      aluminum-magnesium hydroxide-simethicone  30 mL Oral Q4H PRN Mary Fernandez MD      ARIPiprazole  5 mg Oral Daily Khoi Beltran MD      Artificial Tears  1 drop Both Eyes Q12H [facility] Brad Dailey MD       "buprenorphine  1 patch Transdermal Q7 Days Brad Dailey MD      cholecalciferol  2,000 Units Oral Daily Mary Fernandez MD      cyanocobalamin  500 mcg Oral Daily Evelin Rosado PA-C      folic acid  1 mg Oral Daily Brad Dailey MD      haloperidol lactate  5 mg Intramuscular Q4H PRN Max 4/day Mary Fernandez MD      hydrOXYzine HCL  25 mg Oral Q6H PRN Max 4/day Mary Fernandez MD      hydrOXYzine HCL  50 mg Oral Q6H PRN Max 4/day Mary Fernandez MD      lamoTRIgine  75 mg Oral BID Khoi Beltran MD      LORazepam  0.5 mg Oral Q8H PRN Khoi Beltran MD      LORazepam  0.5 mg Oral HS Khoi Beltran MD      melatonin  3 mg Oral HS Mary Fernandez MD      montelukast  10 mg Oral Daily Brad Dailey MD      multivitamin-minerals  1 tablet Oral Daily Brad Dailey MD      nicotine  1 patch Transdermal Daily Brad Dailey MD      nicotine polacrilex  4 mg Oral Q2H PRN Mary Fernandez MD      OLANZapine  5 mg Oral TID PRN Khoi Beltran MD      propranolol  5 mg Oral Q8H PRN Mary Fernandez MD      risperiDONE  0.5 mg Oral Q4H PRN Max 6/day Khoi Beltran MD      risperiDONE  1 mg Oral Q4H PRN Max 3/day Khoi Beltran MD      thiamine  100 mg Oral Daily Brad Dailey MD      traZODone  50 mg Oral HS PRN Khoi Beltran MD      traZODone  50 mg Oral HS Khoi Beltran MD         Risks / Benefits of Treatment:    Risks, benefits, and possible side effects of medications explained to patient. Patient has limited understanding of risks and benefits of treatment at this time, but agrees to take medications as prescribed.    Subjective:    Behavior over the last 24 hours: some improvement    Brisa was seen for continuing care. Patient appears brighter and more spontaneous in her speech. However remains suspicious with limited insight into her behavior. States she feels \"better\" and participated in groups this morning. She has been adherence to her meds and denies any current side effects. Denies any suicidal ideation or wish to " die but still appears suspicious and preoccupied. Staff reports patient is less isolated but takes times to check her meds before taking them. No acute focal neurological deficit or change in mental status noted at this time.      Sleep: improving   Appetite: fair  Medication side effects: No   ROS: no complaints, all other systems are negative    Mental Status Evaluation:    Appearance:  age appropriate, adequate grooming   Behavior:  more calm   Speech:  decreased rate, soft   Mood:  anxious   Affect:  constricted   Thought Process:  concrete, less disorganized   Associations: circumstantial associations   Thought Content:  mild paranoia   Perceptual Disturbances: appears preoccupied   Risk Potential: Suicidal ideation - None at present  Homicidal ideation - None at present  Potential for aggression - Not at present   Sensorium:  oriented to person and place   Memory:  recent and remote memory: unable to assess due to lack of cooperation   Consciousness:  alert and awake   Attention/Concentration: attention span and concentration appear shorter than expected for age   Insight:  poor   Judgment: limited   Gait/Station: normal gait/station   Motor Activity: no abnormal movements     Vital signs in last 24 hours:    Temp:  [97.6 °F (36.4 °C)-97.9 °F (36.6 °C)] 97.6 °F (36.4 °C)  HR:  [] 87  BP: (118-123)/(64-71) 118/71  Resp:  [16-18] 16  SpO2:  [92 %-94 %] 92 %  O2 Device: None (Room air)         Laboratory results: I have personally reviewed all pertinent laboratory/tests results    Most Recent Labs:   Lab Results   Component Value Date    WBC 8.67 03/07/2025    RBC 4.73 03/07/2025    HGB 13.7 03/07/2025    HCT 42.6 03/07/2025     03/07/2025    RDW 12.4 03/07/2025    NEUTROABS 5.25 03/07/2025    SODIUM 139 03/07/2025    K 4.0 03/07/2025     03/07/2025    CO2 31 03/07/2025    BUN 19 03/07/2025    CREATININE 0.72 03/07/2025    GLUC 101 03/07/2025    CALCIUM 9.4 03/07/2025    AST 16 03/07/2025     ALT 16 03/07/2025    ALKPHOS 49 03/07/2025    TP 6.2 (L) 03/07/2025    ALB 3.9 03/07/2025    TBILI 0.37 03/07/2025    CHOLESTEROL 146 03/04/2025    HDL 37 (L) 03/04/2025    TRIG 128 03/04/2025    LDLCALC 83 03/04/2025    NONHDLC 109 03/04/2025    NEX2BKGNFTBB 0.326 (L) 03/02/2025    FREET4 0.97 03/02/2025    TREPONEMAPA Non-reactive 03/04/2025    HGBA1C 5.8 (H) 01/21/2025     01/21/2025       Counseling / Coordination of Care:    Patient's progress discussed with staff in treatment team meeting.  Medication changes reviewed with staff in treatment team meeting.  Supportive therapy provided to patient.  Group attendance encouraged.    Khoi Beltran MD 03/07/25

## 2025-03-08 PROCEDURE — 99232 SBSQ HOSP IP/OBS MODERATE 35: CPT

## 2025-03-08 RX ORDER — POLYETHYLENE GLYCOL 3350 17 G/17G
17 POWDER, FOR SOLUTION ORAL DAILY
Status: DISCONTINUED | OUTPATIENT
Start: 2025-03-08 | End: 2025-03-09

## 2025-03-08 RX ORDER — AMOXICILLIN 250 MG
1 CAPSULE ORAL
Status: DISCONTINUED | OUTPATIENT
Start: 2025-03-08 | End: 2025-03-09

## 2025-03-08 RX ORDER — AMMONIUM LACTATE 12 G/100G
LOTION TOPICAL 2 TIMES DAILY
Status: DISCONTINUED | OUTPATIENT
Start: 2025-03-08 | End: 2025-03-18 | Stop reason: HOSPADM

## 2025-03-08 RX ADMIN — ALUMINUM HYDROXIDE, MAGNESIUM HYDROXIDE, AND DIMETHICONE 30 ML: 200; 20; 200 SUSPENSION ORAL at 21:17

## 2025-03-08 RX ADMIN — POLYETHYLENE GLYCOL 3350 17 G: 17 POWDER, FOR SOLUTION ORAL at 11:58

## 2025-03-08 RX ADMIN — LORAZEPAM 0.5 MG: 0.5 TABLET ORAL at 21:13

## 2025-03-08 RX ADMIN — Medication 1 APPLICATION: at 15:23

## 2025-03-08 RX ADMIN — TRAZODONE HYDROCHLORIDE 50 MG: 50 TABLET ORAL at 21:13

## 2025-03-08 RX ADMIN — LAMOTRIGINE 100 MG: 100 TABLET ORAL at 17:13

## 2025-03-08 RX ADMIN — THIAMINE HCL TAB 100 MG 100 MG: 100 TAB at 08:14

## 2025-03-08 RX ADMIN — DEXTRAN 70, GLYCERIN, HYPROMELLOSE 1 DROP: 1; 2; 3 SOLUTION/ DROPS OPHTHALMIC at 08:14

## 2025-03-08 RX ADMIN — Medication 3 MG: at 21:13

## 2025-03-08 RX ADMIN — CHOLECALCIFEROL TAB 25 MCG (1000 UNIT) 2000 UNITS: 25 TAB at 08:14

## 2025-03-08 RX ADMIN — LAMOTRIGINE 100 MG: 100 TABLET ORAL at 08:14

## 2025-03-08 RX ADMIN — ALUMINUM HYDROXIDE, MAGNESIUM HYDROXIDE, AND DIMETHICONE 30 ML: 200; 20; 200 SUSPENSION ORAL at 15:23

## 2025-03-08 RX ADMIN — NICOTINE 1 PATCH: 14 PATCH, EXTENDED RELEASE TRANSDERMAL at 08:14

## 2025-03-08 RX ADMIN — ARIPIPRAZOLE 5 MG: 5 TABLET ORAL at 08:14

## 2025-03-08 RX ADMIN — CYANOCOBALAMIN TAB 500 MCG 500 MCG: 500 TAB at 08:14

## 2025-03-08 RX ADMIN — FOLIC ACID 1 MG: 1 TABLET ORAL at 08:14

## 2025-03-08 RX ADMIN — Medication 1 TABLET: at 08:14

## 2025-03-08 RX ADMIN — MONTELUKAST 10 MG: 10 TABLET, FILM COATED ORAL at 08:14

## 2025-03-08 NOTE — NURSING NOTE
Patient visible on the unit. Social with peers. Compliant with medications. Denies SI, HI, and hallucinations. Endorses anxiety and depression. Complains of pain in feet and dry skin. Wishes for a podiatry consult due to pain and asks for nails to be cut. States she has not had a bowel movement in one week. Requests medication. Will continue to monitor and access. Q 15 minute safety rounds maintained.

## 2025-03-08 NOTE — ASSESSMENT & PLAN NOTE
Continue Lamictal 100 mg po bid, increased on 3/7/25  Abilify 5 mg po daily.  Trazodone 50 mg po hs.  Ativan 0.5 mg po hs.

## 2025-03-08 NOTE — PROGRESS NOTES
"Progress Note - Brisa Nick 59 y.o. female MRN: [id number]    Unit/Bed#: [facility] 205-01 Encounter: [id number]        Subjective:   Patient seen and examined at bedside after reviewing the chart and discussing the case with the caring staff.      Patient examined at bedside.  Patient complaining of constipation.  Does not recall when her last BM was.  No abdominal pain, nausea, vomiting.  Lightheadedness has improved from 2 days ago.  BPs have been WNL.  Also complains of dry skin to bilateral legs and requesting podiatry consult.    Physical Exam   Vitals: Blood pressure 117/60, pulse 88, temperature 97.9 °F (36.6 °C), temperature source Temporal, resp. rate 18, height 5' 3\" (1.6 m), weight 74.1 kg (163 lb 6.4 oz), SpO2 92%.,Body mass index is 28.95 kg/m².  Constitutional: Patient in no acute distress.  HEENT: PERR, EOMI, MMM.  Cardiovascular: Normal rate and regular rhythm.    Pulmonary/Chest: Effort normal and breath sounds normal.   Abdomen: Soft, + BS, NT.    Assessment/Plan:  Brisa Nick is a(n) 59 y.o. year old female with bipolar affective disorder.     Asthma.  Patient is on Singulair 10 mg daily.  Albuterol inhaler as needed.  Chronic pain syndrome.  Involving lower back pain and bilateral hips.  Patient follows with pain management on outpt basis and on Butrans patch weekly.  Tylenol as needed.  Nicotine dependence.  NRT.  Alcohol abuse.  Patient started thiamine, folic acid and multivitamin.  Insomnia.  Patient is on melatonin 3 mg at bedtime.  Vitamin D deficiency.  Continue vitamin D supplement.  Dry eyes.  Patient is on artificial tears twice daily.  Vitamin B12 deficiency.  Pt started on vitamin B12 500 mcg daily.   Constipation.  Start Senokot-S nightly and MiraLAX daily x 3 days 3/8.  Dry skin.  Apply ammonium lactate twice daily.  Overgrown toenails.  Podiatry consulted.    The patient was discussed with Dr. Dailey and he is in agreement with the above note.  "

## 2025-03-08 NOTE — NURSING NOTE
Pt up ad thomas & gait is steady. Pt is cooperative & compliant with medications. Pt is pleasant & socializes with other patients. Pt denies any hallucinations, suicidal or homicidal ideations. Q 15 min checks maintained to monitor pt's behavior & safety. Pt c/o mild depression & mild anxiety.

## 2025-03-08 NOTE — PROGRESS NOTES
Progress Note - Behavioral Health   Name: Brisa Nick 59 y.o. female I MRN: [id number]  Unit/Bed#: [facility] 205-01 I Date of Admission: 3/3/2025   Date of Service: 3/8/2025 I Hospital Day: 5     Assessment & Plan  Degenerative disc disease, cervical    Migraine headache    Fibromyalgia    Posttraumatic stress disorder    Crohn's colitis (HCC)    GERD (gastroesophageal reflux disease)    Bipolar affective disorder, depressed, severe, with psychotic behavior (HCC)  Continue Lamictal 100 mg po bid, increased on 3/7/25  Abilify 5 mg po daily.  Trazodone 50 mg po hs.  Ativan 0.5 mg po hs.  Moderate protein-calorie malnutrition (HCC)  Malnutrition Findings:   Adult Malnutrition type: Chronic illness  Adult Degree of Malnutrition: Malnutrition of moderate degree  Malnutrition Characteristics: Inadequate energy, Weight loss  360 Statement: Malnutrition related to inadequate energy intake as evidenced by 34#(17%) weight loss from 9/10/# to 3/3/# and <75% energy intake compared to estimated needs > 1 month.    BMI Findings:      Body mass index is 28.95 kg/m².       Current medications:  Current Facility-Administered Medications   Medication Dose Route Frequency Provider Last Rate    acetaminophen  650 mg Oral Q4H PRN Mary Fernandez MD      acetaminophen  650 mg Oral Q4H PRN Mary Fernandez MD      acetaminophen  975 mg Oral Q6H PRN Mary Fernandez MD      albuterol  2 puff Inhalation Q4H PRN Brad Dailey MD      aluminum-magnesium hydroxide-simethicone  30 mL Oral Q4H PRN Mary Fernandez MD      ARIPiprazole  5 mg Oral Daily Khoi Beltran MD      Artificial Tears  1 drop Both Eyes Q12H [facility] Brad Dailey MD      buprenorphine  1 patch Transdermal Q7 Days Brad Dailey MD      cholecalciferol  2,000 Units Oral Daily Mary Fernandez MD      cyanocobalamin  500 mcg Oral Daily Evelin Rosado PA-C      folic acid  1 mg Oral Daily Brad Dailey MD      haloperidol lactate  5 mg Intramuscular Q4H PRN Max  4/day Mary Fernandez MD      hydrOXYzine HCL  25 mg Oral Q6H PRN Max 4/day Mary Fernandez MD      hydrOXYzine HCL  50 mg Oral Q6H PRN Max 4/day Mary Fernandez MD      lamoTRIgine  100 mg Oral BID Khoi Beltran MD      LORazepam  0.5 mg Oral Q8H PRN Khoi Beltran MD      LORazepam  0.5 mg Oral HS Khoi Beltran MD      melatonin  3 mg Oral HS Mary Fernandez MD      montelukast  10 mg Oral Daily Brad Dailey MD      multivitamin-minerals  1 tablet Oral Daily Brad Dailey MD      nicotine  1 patch Transdermal Daily Brad Dailey MD      nicotine polacrilex  4 mg Oral Q2H PRN Mary Fernandez MD      OLANZapine  5 mg Oral TID PRN Khoi Beltran MD      propranolol  5 mg Oral Q8H PRN Mary Fernandez MD      risperiDONE  0.5 mg Oral Q4H PRN Max 6/day Khoi Beltran MD      risperiDONE  1 mg Oral Q4H PRN Max 3/day Khoi Beltran MD      thiamine  100 mg Oral Daily Brad Dailey MD      traZODone  50 mg Oral HS PRN Khoi Beltran MD      traZODone  50 mg Oral HS Khoi Beltran MD          Risks/Benefits of Treatment:     Risks, benefits, and possible side effects of medications explained to patient. Patient has limited understanding of risks and benefits of treatment at this time, but agrees to take medications as prescribed.    Progress Toward Goals: progressing    Treatment Planning:      - Encourage early mobility and having a structured day  - Provide frequent re-orientation, and cognitive stimulation  - Ensure assistive devices are in proper working order (eye-glasses, hearing aids)  - Encourage adequate hydration, nutrition and monitor bowel movements  - Maintain sleep-wake cycle: Uninterrupted sleep time; low-level lighting at night  - Fall precaution  - f/u SLIM recs regarding the medical problems   - Continue medication titration and treatment plan; adjust medication to optimize treatment response and as clinically indicated.   - Observation:Routine  -  Subjective       Patient was visited on unit for continuing  care; chart reviewed and discussed with multidisciplinary treatment team.  On approach, the patient was calm and superficially cooperative.  Staff reports improvement with length of stay, no acute behaviors over past 24 hours.  Compliant with medications, recent increase of Lamictal on 3/7 tolerated with mild sedation and continue to monitor.  No elevated mood observed.  Denies SI and HI.    Patient continues to be visible in the milieu and interacts with staff and peers. No reports of aggression or self-injurious behavior on unit. No PRN medications used in the past 24 hours.    Patient accepted all offered medications and no adverse effects of medications noted or reported.    Sleep: normal  Appetite: fair  Medication side effects: No  ROS: review of systems as noted above in HPI/Subjective report, all other systems are negative    Objective :  Temp:  [97.4 °F (36.3 °C)-97.9 °F (36.6 °C)] 97.9 °F (36.6 °C)  HR:  [76-88] 88  BP: (117-137)/(60-69) 117/60  Resp:  [18] 18  SpO2:  [92 %-95 %] 92 %    Temp:  [97.4 °F (36.3 °C)-97.9 °F (36.6 °C)] 97.9 °F (36.6 °C)  HR:  [76-88] 88  BP: (117-137)/(60-69) 117/60  Resp:  [18] 18  SpO2:  [92 %-95 %] 92 %    Mental Status Evaluation:    Appearance:  age appropriate, casually dressed, adequate grooming   Behavior:  cooperative, calm   Speech:  soft   Mood:  depressed   Affect:  constricted   Thought Process:  circumstantial   Thought Content:  negative thoughts   Perceptual Disturbances: no auditory hallucinations, no visual hallucinations   Risk Potential: Suicidal Ideation -  None at present  Homicidal Ideation -  None  Potential for Aggression - No   Sensorium:  oriented to person, place, and time/date   Memory:  recent and remote memory grossly intact   Consciousness:  alert and awake   Attention/Concentration: attention span and concentration appear shorter than expected for age   Insight:  limited   Judgment: limited   Gait/Station: normal gait/station, normal balance    Motor Activity: no abnormal movements           Lab Results: I have reviewed the following results:  Most Recent Labs:   Lab Results   Component Value Date    WBC 8.67 03/07/2025    RBC 4.73 03/07/2025    HGB 13.7 03/07/2025    HCT 42.6 03/07/2025     03/07/2025    RDW 12.4 03/07/2025    NEUTROABS 5.25 03/07/2025    SODIUM 139 03/07/2025    K 4.0 03/07/2025     03/07/2025    CO2 31 03/07/2025    BUN 19 03/07/2025    CREATININE 0.72 03/07/2025    GLUC 101 03/07/2025    CALCIUM 9.4 03/07/2025    AST 16 03/07/2025    ALT 16 03/07/2025    ALKPHOS 49 03/07/2025    TP 6.2 (L) 03/07/2025    ALB 3.9 03/07/2025    TBILI 0.37 03/07/2025    CHOLESTEROL 146 03/04/2025    HDL 37 (L) 03/04/2025    TRIG 128 03/04/2025    LDLCALC 83 03/04/2025    NONHDLC 109 03/04/2025    [id number] 0.326 (L) 03/02/2025    FREET4 0.97 03/02/2025    HGBA1C 5.8 (H) 01/21/2025     01/21/2025       Administrative Statements     Counseling / Coordination of Care:   Patient's progress discussed with staff in treatment team meeting..    SMITA Lorenzana 03/08/25

## 2025-03-08 NOTE — PLAN OF CARE
Problem: Alteration in Thoughts and Perception  Goal: Treatment Goal: Gain control of psychotic behaviors/thinking, reduce/eliminate presenting symptoms and demonstrate improved reality functioning upon discharge  Outcome: Progressing  Goal: Verbalize thoughts and feelings  Description: Interventions:  - Promote a nonjudgmental and trusting relationship with the patient through active listening and therapeutic communication  - Assess patient's level of functioning, behavior and potential for risk  - Engage patient in 1 on 1 interactions  - Encourage patient to express fears, feelings, frustrations, and discuss symptoms    - [place] patient to reality, help patient recognize reality-based thinking   - Administer medications as ordered and assess for potential side effects  - Provide the patient education related to the signs and symptoms of the illness and desired effects of prescribed medications  Outcome: Progressing  Goal: Refrain from acting on delusional thinking/internal stimuli  Description: Interventions:  - Monitor patient closely, per order   - Utilize least restrictive measures   - Set reasonable limits, give positive feedback for acceptable   - Administer medications as ordered and monitor of potential side effects  Outcome: Progressing  Goal: Agree to be compliant with medication regime, as prescribed and report medication side effects  Description: Interventions:  - Offer appropriate PRN medication and supervise ingestion; conduct AIMS, as needed   Outcome: Progressing  Goal: Attend and participate in unit activities, including therapeutic, recreational, and educational groups  Description: Interventions:  -Encourage Visitation and family involvement in care  Outcome: Progressing  Goal: Recognize dysfunctional thoughts, communicate reality-based thoughts at the time of discharge  Description: Interventions:  - Provide medication and psycho-education to assist patient in compliance and developing  insight into his/her illness   Outcome: Progressing  Goal: Complete daily ADLs, including personal hygiene independently, as able  Description: Interventions:  - Observe, teach, and assist patient with ADLS  - Monitor and promote a balance of rest/activity, with adequate nutrition and elimination   Outcome: Progressing     Problem: Ineffective Coping  Goal: Identifies ineffective coping skills  Outcome: Progressing  Goal: Identifies healthy coping skills  Outcome: Progressing  Goal: Demonstrates healthy coping skills  Outcome: Progressing  Goal: Participates in unit activities  Description: Interventions:  - Provide therapeutic environment   - Provide required programming   - Redirect inappropriate behaviors   Outcome: Progressing  Goal: Patient/Family participate in treatment and DC plans  Description: Interventions:  - Provide therapeutic environment  Outcome: Progressing  Goal: Patient/Family verbalizes awareness of resources  Outcome: Progressing  Goal: Understands least restrictive measures  Description: Interventions:  - Utilize least restrictive behavior  Outcome: Progressing  Goal: Free from restraint events  Description: - Utilize least restrictive measures   - Provide behavioral interventions   - Redirect inappropriate behaviors   Outcome: Progressing     Problem: Risk for Self Injury/Neglect  Goal: Treatment Goal: Remain safe during length of stay, learn and adopt new coping skills, and be free of self-injurious ideation, impulses and acts at the time of discharge  Outcome: Progressing  Goal: Verbalize thoughts and feelings  Description: Interventions:  - Assess and re-assess patient's lethality and potential for self-injury  - Engage patient in 1:1 interactions, daily, for a minimum of 15 minutes  - Encourage patient to express feelings, fears, frustrations, hopes  - Establish rapport/trust with patient   Outcome: Progressing  Goal: Refrain from harming self  Description: Interventions:  - Monitor  patient closely, per order  - Develop a trusting relationship  - Supervise medication ingestion, monitor effects and side effects   Outcome: Progressing  Goal: Attend and participate in unit activities, including therapeutic, recreational, and educational groups  Description: Interventions:  - Provide therapeutic and educational activities daily, encourage attendance and participation, and document same in the medical record  - Obtain collateral information, encourage visitation and family involvement in care   Outcome: Progressing  Goal: Recognize maladaptive responses and adopt new coping mechanisms  Outcome: Progressing  Goal: Complete daily ADLs, including personal hygiene independently, as able  Description: Interventions:  - Observe, teach, and assist patient with ADLS  - Monitor and promote a balance of rest/activity, with adequate nutrition and elimination  Outcome: Progressing     Problem: Depression  Goal: Treatment Goal: Demonstrate behavioral control of depressive symptoms, verbalize feelings of improved mood/affect, and adopt new coping skills prior to discharge  Outcome: Progressing  Goal: Verbalize thoughts and feelings  Description: Interventions:  - Assess and re-assess patient's level of risk   - Engage patient in 1:1 interactions, daily, for a minimum of 15 minutes   - Encourage patient to express feelings, fears, frustrations, hopes   Outcome: Progressing  Goal: Refrain from harming self  Description: Interventions:  - Monitor patient closely, per order   - Supervise medication ingestion, monitor effects and side effects   Outcome: Progressing  Goal: Refrain from isolation  Description: Interventions:  - Develop a trusting relationship   - Encourage socialization   Outcome: Progressing  Goal: Refrain from self-neglect  Outcome: Progressing  Goal: Attend and participate in unit activities, including therapeutic, recreational, and educational groups  Description: Interventions:  - Provide  therapeutic and educational activities daily, encourage attendance and participation, and document same in the medical record   Outcome: Progressing  Goal: Complete daily ADLs, including personal hygiene independently, as able  Description: Interventions:  - Observe, teach, and assist patient with ADLS  -  Monitor and promote a balance of rest/activity, with adequate nutrition and elimination   Outcome: Progressing     Problem: Risk for Violence/Aggression Toward Others  Goal: Treatment Goal: Refrain from acts of violence/aggression during length of stay, and demonstrate improved impulse control at the time of discharge  Outcome: Progressing  Goal: Verbalize thoughts and feelings  Description: Interventions:  - Assess and re-assess patient's level of risk, every waking shift  - Engage patient in 1:1 interactions, daily, for a minimum of 15 minutes   - Allow patient to express feelings and frustrations in a safe and non-threatening manner   - Establish rapport/trust with patient   Outcome: Progressing  Goal: Refrain from harming others  Outcome: Progressing  Goal: Refrain from destructive acts on the environment or property  Outcome: Progressing  Goal: Control angry outbursts  Description: Interventions:  - Monitor patient closely, per order  - Ensure early verbal de-escalation  - Monitor prn medication needs  - Set reasonable/therapeutic limits, outline behavioral expectations, and consequences   - Provide a non-threatening milieu, utilizing the least restrictive interventions   Outcome: Progressing  Goal: Attend and participate in unit activities, including therapeutic, recreational, and educational groups  Description: Interventions:  - Provide therapeutic and educational activities daily, encourage attendance and participation, and document same in the medical record   Outcome: Progressing  Goal: Identify appropriate positive anger management techniques  Description: Interventions:  - Offer anger management and  coping skills groups   - Staff will provide positive feedback for appropriate anger control  Outcome: Progressing

## 2025-03-09 PROCEDURE — 99232 SBSQ HOSP IP/OBS MODERATE 35: CPT

## 2025-03-09 RX ORDER — CALCIUM CARBONATE 500 MG/1
500 TABLET, CHEWABLE ORAL 3 TIMES DAILY PRN
Status: DISCONTINUED | OUTPATIENT
Start: 2025-03-09 | End: 2025-03-18 | Stop reason: HOSPADM

## 2025-03-09 RX ADMIN — Medication: at 17:36

## 2025-03-09 RX ADMIN — TRAZODONE HYDROCHLORIDE 50 MG: 50 TABLET ORAL at 20:57

## 2025-03-09 RX ADMIN — NICOTINE 1 PATCH: 14 PATCH, EXTENDED RELEASE TRANSDERMAL at 08:43

## 2025-03-09 RX ADMIN — LORAZEPAM 0.5 MG: 0.5 TABLET ORAL at 20:57

## 2025-03-09 RX ADMIN — ARIPIPRAZOLE 5 MG: 5 TABLET ORAL at 08:43

## 2025-03-09 RX ADMIN — THIAMINE HCL TAB 100 MG 100 MG: 100 TAB at 08:43

## 2025-03-09 RX ADMIN — CYANOCOBALAMIN TAB 500 MCG 500 MCG: 500 TAB at 08:43

## 2025-03-09 RX ADMIN — CHOLECALCIFEROL TAB 25 MCG (1000 UNIT) 2000 UNITS: 25 TAB at 08:43

## 2025-03-09 RX ADMIN — Medication: at 08:44

## 2025-03-09 RX ADMIN — MONTELUKAST 10 MG: 10 TABLET, FILM COATED ORAL at 08:43

## 2025-03-09 RX ADMIN — ALUMINUM HYDROXIDE, MAGNESIUM HYDROXIDE, AND DIMETHICONE 30 ML: 200; 20; 200 SUSPENSION ORAL at 04:12

## 2025-03-09 RX ADMIN — DEXTRAN 70, GLYCERIN, HYPROMELLOSE 1 DROP: 1; 2; 3 SOLUTION/ DROPS OPHTHALMIC at 20:56

## 2025-03-09 RX ADMIN — DEXTRAN 70, GLYCERIN, HYPROMELLOSE 1 DROP: 1; 2; 3 SOLUTION/ DROPS OPHTHALMIC at 08:44

## 2025-03-09 RX ADMIN — Medication 1 TABLET: at 08:43

## 2025-03-09 RX ADMIN — Medication 3 MG: at 20:57

## 2025-03-09 RX ADMIN — LAMOTRIGINE 100 MG: 100 TABLET ORAL at 17:15

## 2025-03-09 RX ADMIN — ALUMINUM HYDROXIDE, MAGNESIUM HYDROXIDE, AND DIMETHICONE 30 ML: 200; 20; 200 SUSPENSION ORAL at 11:46

## 2025-03-09 RX ADMIN — FOLIC ACID 1 MG: 1 TABLET ORAL at 08:43

## 2025-03-09 RX ADMIN — ALUMINUM HYDROXIDE, MAGNESIUM HYDROXIDE, AND DIMETHICONE 30 ML: 200; 20; 200 SUSPENSION ORAL at 22:51

## 2025-03-09 RX ADMIN — LAMOTRIGINE 100 MG: 100 TABLET ORAL at 08:43

## 2025-03-09 NOTE — PROGRESS NOTES
"Progress Note - Brisa Nick 59 y.o. female MRN: [id number]    Unit/Bed#: [facility]-01 Encounter: [id number]        Subjective:   Patient seen and examined at bedside after reviewing the chart and discussing the case with the caring staff.      Patient examined at bedside.  Patient complained of constipation yesterday and received Senokot S and Miralax.  Reports having 4 episodes of diarrhea today.  Denies any other complaints at this time.     Physical Exam   Vitals: Blood pressure 112/60, pulse 83, temperature 97.5 °F (36.4 °C), temperature source Temporal, resp. rate 18, height 5' 3\" (1.6 m), weight 74.1 kg (163 lb 6.4 oz), SpO2 95%.,Body mass index is 28.95 kg/m².  Constitutional: Patient in no acute distress.  HEENT: PERR, EOMI, MMM.  Cardiovascular: Normal rate and regular rhythm.    Pulmonary/Chest: Effort normal and breath sounds normal.   Abdomen: Soft, + BS, NT.    Assessment/Plan:  Brisa Nick is a(n) 59 y.o. year old female with bipolar affective disorder.     Asthma.  Patient is on Singulair 10 mg daily.  Albuterol inhaler as needed.  Chronic pain syndrome.  Involving lower back pain and bilateral hips.  Patient follows with pain management on outpt basis and on Butrans patch weekly.  Tylenol as needed.  Nicotine dependence.  NRT.  Alcohol abuse.  Patient started thiamine, folic acid and multivitamin.  Insomnia.  Patient is on melatonin 3 mg at bedtime.  Vitamin D deficiency.  Continue vitamin D supplement.  Dry eyes.  Patient is on artificial tears twice daily.  Vitamin B12 deficiency.  Pt started on vitamin B12 500 mcg daily.   Constipation.  Hold Senokot-S and MiraLAX daily given reported diarrhea 3/9.  Dry skin.  Apply ammonium lactate twice daily.  Overgrown toenails.  Podiatry consulted.    The patient was discussed with Dr. Dailey and he is in agreement with the above note.  "

## 2025-03-09 NOTE — PROGRESS NOTES
Progress Note - Behavioral Health   Name: Brisa Nick 59 y.o. female I MRN: [id number]  Unit/Bed#: [facility]-01 I Date of Admission: 3/3/2025   Date of Service: 3/9/2025 I Hospital Day: 6     Assessment & Plan  Degenerative disc disease, cervical    Migraine headache    Fibromyalgia    Posttraumatic stress disorder    Crohn's colitis (HCC)    GERD (gastroesophageal reflux disease)    Bipolar affective disorder, depressed, severe, with psychotic behavior (HCC)  Continue Lamictal 100 mg po bid, increased on 3/7/25  Abilify 5 mg po daily.  Trazodone 50 mg po hs.  Ativan 0.5 mg po hs.  Moderate protein-calorie malnutrition (HCC)  Malnutrition Findings:   Adult Malnutrition type: Chronic illness  Adult Degree of Malnutrition: Malnutrition of moderate degree  Malnutrition Characteristics: Inadequate energy, Weight loss  360 Statement: Malnutrition related to inadequate energy intake as evidenced by 34#(17%) weight loss from 9/10/# to 3/3/# and <75% energy intake compared to estimated needs > 1 month.    BMI Findings:      Body mass index is 28.95 kg/m².       Current medications:  Current Facility-Administered Medications   Medication Dose Route Frequency Provider Last Rate    acetaminophen  650 mg Oral Q4H PRN Mary Fernandez MD      acetaminophen  650 mg Oral Q4H PRN Mary Fernandez MD      acetaminophen  975 mg Oral Q6H PRN Mary Fernandez MD      albuterol  2 puff Inhalation Q4H PRN Brad Dailey MD      aluminum-magnesium hydroxide-simethicone  30 mL Oral Q4H PRN Mary Fernandez MD      ammonium lactate   Topical BID Evelin Rosado PA-C      ARIPiprazole  5 mg Oral Daily Khoi Beltran MD      Artificial Tears  1 drop Both Eyes Q12H [facility] Brad Dailey MD      buprenorphine  1 patch Transdermal Q7 Days Brad Dailey MD      cholecalciferol  2,000 Units Oral Daily Mary Fernandez MD      cyanocobalamin  500 mcg Oral Daily Evelin Rosado PA-C      folic acid  1 mg Oral Daily Brad Dailey MD       haloperidol lactate  5 mg Intramuscular Q4H PRN Max 4/day Mary Fernandez MD      hydrOXYzine HCL  25 mg Oral Q6H PRN Max 4/day Mary Fernandez MD      hydrOXYzine HCL  50 mg Oral Q6H PRN Max 4/day Mary Fernandez MD      lamoTRIgine  100 mg Oral BID Khoi Beltran MD      LORazepam  0.5 mg Oral Q8H PRN Khoi Beltran MD      LORazepam  0.5 mg Oral HS Khoi Beltran MD      melatonin  3 mg Oral HS Mary Fernandez MD      montelukast  10 mg Oral Daily Brad Dailey MD      multivitamin-minerals  1 tablet Oral Daily Brad Dailey MD      nicotine  1 patch Transdermal Daily Brad Dailey MD      nicotine polacrilex  4 mg Oral Q2H PRN Mary Fernandez MD      OLANZapine  5 mg Oral TID PRN Khoi Beltran MD      polyethylene glycol  17 g Oral Daily TYESHA Osorio-C      propranolol  5 mg Oral Q8H PRN Mary Fernandez MD      risperiDONE  0.5 mg Oral Q4H PRN Max 6/day Khoi Beltran MD      risperiDONE  1 mg Oral Q4H PRN Max 3/day Khoi Beltran MD      senna-docusate sodium  1 tablet Oral HS Evelin Roasdo, PA-C      thiamine  100 mg Oral Daily Brad Dailey MD      traZODone  50 mg Oral HS PRN Khoi Beltran MD      traZODone  50 mg Oral HS Khoi Beltran MD          Risks/Benefits of Treatment:     Risks, benefits, and possible side effects of medications explained to patient. Patient has limited understanding of risks and benefits of treatment at this time, but agrees to take medications as prescribed.        Treatment Planning:      - Encourage early mobility and having a structured day  - Provide frequent re-orientation, and cognitive stimulation  - Ensure assistive devices are in proper working order (eye-glasses, hearing aids)  - Encourage adequate hydration, nutrition and monitor bowel movements  - Maintain sleep-wake cycle: Uninterrupted sleep time; low-level lighting at night  - Fall precaution  - f/u SLIM recs regarding the medical problems   - Continue medication titration and treatment plan; adjust  medication to optimize treatment response and as clinically indicated.   - Observation:Routine    Subjective       Patient was visited on unit for continuing care; chart reviewed and discussed with multidisciplinary treatment team.  On approach, the patient was calm, pleasant and cooperative.    Patient continues to be visible in the milieu and interacts with staff and peers. No reports of aggression or self-injurious behavior on unit. No PRN medications used in the past 24 hours.  No behavioral changes overnight, continues to focus on discharge planning.  Mood appears to be improving with Lamictal 100 mg twice daily and Abilify 5 mg daily.  Continues to have periods of rambling, circumstantial speech.  Denies overt hallucinations.  Denies SI.    Patient accepted all offered medications and no adverse effects of medications noted or reported.    Sleep: normal  Appetite: normal  Medication side effects: No  ROS: review of systems as noted above in HPI/Subjective report, all other systems are negative    Objective :  Temp:  [97.5 °F (36.4 °C)-97.8 °F (36.6 °C)] 97.5 °F (36.4 °C)  HR:  [83-95] 83  BP: (101-125)/(53-60) 112/60  Resp:  [18] 18  SpO2:  [91 %-97 %] 95 %  O2 Device: None (Room air)    Temp:  [97.5 °F (36.4 °C)-97.8 °F (36.6 °C)] 97.5 °F (36.4 °C)  HR:  [83-95] 83  BP: (101-125)/(53-60) 112/60  Resp:  [18] 18  SpO2:  [91 %-97 %] 95 %  O2 Device: None (Room air)    Mental Status Evaluation:    Appearance:  age appropriate, casually dressed, adequate grooming   Behavior:  pleasant, cooperative, calm   Speech:  normal rate and volume   Mood:  depressed   Affect:  constricted   Thought Process:  circumstantial   Thought Content:  negative thinking   Perceptual Disturbances: denies auditory hallucinations when asked, does not appear responding to internal stimuli   Risk Potential: Suicidal Ideation -  None at present  Homicidal Ideation -  None  Potential for Aggression - No   Sensorium:  oriented to person,  place, and time/date   Memory:  recent and remote memory grossly intact   Consciousness:  alert and awake   Attention/Concentration: attention span and concentration appear shorter than expected for age   Insight:  limited   Judgment: limited   Gait/Station: normal gait/station, normal balance   Motor Activity: no abnormal movements           Lab Results: I have reviewed the following results:  Most Recent Labs:   Lab Results   Component Value Date    WBC 8.67 03/07/2025    RBC 4.73 03/07/2025    HGB 13.7 03/07/2025    HCT 42.6 03/07/2025     03/07/2025    RDW 12.4 03/07/2025    NEUTROABS 5.25 03/07/2025    SODIUM 139 03/07/2025    K 4.0 03/07/2025     03/07/2025    CO2 31 03/07/2025    BUN 19 03/07/2025    CREATININE 0.72 03/07/2025    GLUC 101 03/07/2025    CALCIUM 9.4 03/07/2025    AST 16 03/07/2025    ALT 16 03/07/2025    ALKPHOS 49 03/07/2025    TP 6.2 (L) 03/07/2025    ALB 3.9 03/07/2025    TBILI 0.37 03/07/2025    CHOLESTEROL 146 03/04/2025    HDL 37 (L) 03/04/2025    TRIG 128 03/04/2025    LDLCALC 83 03/04/2025    NONHDLC 109 03/04/2025    [id number] 0.326 (L) 03/02/2025    FREET4 0.97 03/02/2025    HGBA1C 5.8 (H) 01/21/2025     01/21/2025       Administrative Statements     Counseling / Coordination of Care:   Patient's progress discussed with staff in treatment team meeting..    SMITA Lorenzana 03/09/25

## 2025-03-09 NOTE — NURSING NOTE
Pt was visible on the unit, social with peers. Compliant with medication. Cooperative with staff during care. Endorsed mild to moderate anxiety. Denied SI, HI, or AVH. Affect was constricted. Eye contact was good. Speech pattern was rambling. Appearance and hygiene was unremarkable.  Displayed disorganized constriction. Made no c/o pain or discomfort. Safety checks continued.

## 2025-03-09 NOTE — PLAN OF CARE
Problem: Alteration in Thoughts and Perception  Goal: Verbalize thoughts and feelings  Description: Interventions:  - Promote a nonjudgmental and trusting relationship with the patient through active listening and therapeutic communication  - Assess patient's level of functioning, behavior and potential for risk  - Engage patient in 1 on 1 interactions  - Encourage patient to express fears, feelings, frustrations, and discuss symptoms    - [place] patient to reality, help patient recognize reality-based thinking   - Administer medications as ordered and assess for potential side effects  - Provide the patient education related to the signs and symptoms of the illness and desired effects of prescribed medications  Outcome: Progressing     Problem: Ineffective Coping  Goal: Free from restraint events  Description: - Utilize least restrictive measures   - Provide behavioral interventions   - Redirect inappropriate behaviors   Outcome: Progressing     Problem: Risk for Self Injury/Neglect  Goal: Refrain from harming self  Description: Interventions:  - Monitor patient closely, per order  - Develop a trusting relationship  - Supervise medication ingestion, monitor effects and side effects   Outcome: Progressing

## 2025-03-09 NOTE — NURSING NOTE
Pt calm and cooperative. Visible and social on the unit. Bright and pleasant on approach. + meals and meds. Good appetite. Compliant w/ mouth checks. Appropriate on assessment. Denies all psych S+S's at this time. Able to maintain, linear reality based conversations. Overheard educating a  peer accurately about the benefits of prescribed vitamin supplements. Does seem to be mildly forgetful at times. Complains of multiple episodes of loose stools; therefore refuses scheduled Miralax. Pt spends time coloring and talking on the phone. Attends/participates in groups.Denies unmet needs. Q15 safety checks maintained.

## 2025-03-10 PROCEDURE — 99232 SBSQ HOSP IP/OBS MODERATE 35: CPT | Performed by: PSYCHIATRY & NEUROLOGY

## 2025-03-10 RX ORDER — DIPHENOXYLATE HYDROCHLORIDE AND ATROPINE SULFATE 2.5; .025 MG/1; MG/1
1 TABLET ORAL 4 TIMES DAILY PRN
Status: DISCONTINUED | OUTPATIENT
Start: 2025-03-10 | End: 2025-03-11

## 2025-03-10 RX ORDER — LAMOTRIGINE 100 MG/1
100 TABLET ORAL
Status: DISCONTINUED | OUTPATIENT
Start: 2025-03-10 | End: 2025-03-18 | Stop reason: HOSPADM

## 2025-03-10 RX ORDER — PANTOPRAZOLE SODIUM 40 MG/1
40 TABLET, DELAYED RELEASE ORAL
Status: DISCONTINUED | OUTPATIENT
Start: 2025-03-11 | End: 2025-03-10

## 2025-03-10 RX ORDER — PANTOPRAZOLE SODIUM 40 MG/1
40 TABLET, DELAYED RELEASE ORAL
Status: DISCONTINUED | OUTPATIENT
Start: 2025-03-10 | End: 2025-03-18 | Stop reason: HOSPADM

## 2025-03-10 RX ORDER — LAMOTRIGINE 25 MG/1
50 TABLET ORAL DAILY
Status: DISCONTINUED | OUTPATIENT
Start: 2025-03-11 | End: 2025-03-18 | Stop reason: HOSPADM

## 2025-03-10 RX ORDER — ARIPIPRAZOLE 10 MG/1
10 TABLET ORAL DAILY
Status: DISCONTINUED | OUTPATIENT
Start: 2025-03-11 | End: 2025-03-18 | Stop reason: HOSPADM

## 2025-03-10 RX ADMIN — CYANOCOBALAMIN TAB 500 MCG 500 MCG: 500 TAB at 08:24

## 2025-03-10 RX ADMIN — BUPRENORPHINE 1 PATCH: 15 PATCH, EXTENDED RELEASE TRANSDERMAL at 14:34

## 2025-03-10 RX ADMIN — ACETAMINOPHEN 975 MG: 325 TABLET ORAL at 20:56

## 2025-03-10 RX ADMIN — ALUMINUM HYDROXIDE, MAGNESIUM HYDROXIDE, AND DIMETHICONE 30 ML: 200; 20; 200 SUSPENSION ORAL at 10:10

## 2025-03-10 RX ADMIN — DIPHENOXYLATE HYDROCHLORIDE AND ATROPINE SULFATE 1 TABLET: 2.5; .025 TABLET ORAL at 14:34

## 2025-03-10 RX ADMIN — TRAZODONE HYDROCHLORIDE 50 MG: 50 TABLET ORAL at 20:50

## 2025-03-10 RX ADMIN — Medication: at 17:17

## 2025-03-10 RX ADMIN — THIAMINE HCL TAB 100 MG 100 MG: 100 TAB at 08:24

## 2025-03-10 RX ADMIN — CHOLECALCIFEROL TAB 25 MCG (1000 UNIT) 2000 UNITS: 25 TAB at 08:24

## 2025-03-10 RX ADMIN — MONTELUKAST 10 MG: 10 TABLET, FILM COATED ORAL at 08:24

## 2025-03-10 RX ADMIN — LAMOTRIGINE 100 MG: 100 TABLET ORAL at 20:50

## 2025-03-10 RX ADMIN — FOLIC ACID 1 MG: 1 TABLET ORAL at 08:24

## 2025-03-10 RX ADMIN — ARIPIPRAZOLE 5 MG: 5 TABLET ORAL at 08:24

## 2025-03-10 RX ADMIN — Medication 3 MG: at 20:50

## 2025-03-10 RX ADMIN — PANTOPRAZOLE SODIUM 40 MG: 40 TABLET, DELAYED RELEASE ORAL at 14:34

## 2025-03-10 RX ADMIN — ACETAMINOPHEN 650 MG: 325 TABLET ORAL at 04:03

## 2025-03-10 RX ADMIN — DEXTRAN 70, GLYCERIN, HYPROMELLOSE 1 DROP: 1; 2; 3 SOLUTION/ DROPS OPHTHALMIC at 08:25

## 2025-03-10 RX ADMIN — LAMOTRIGINE 100 MG: 100 TABLET ORAL at 08:24

## 2025-03-10 RX ADMIN — Medication: at 08:26

## 2025-03-10 RX ADMIN — Medication 1 TABLET: at 08:24

## 2025-03-10 NOTE — TREATMENT TEAM
03/10/25 0503   Pain Assessment Post Intervention   Reason Not Indicated Sleeping / Easy to arouse   Pain Assessment Tool Used: 0-10   Post Intervention Pain Score No Pain   Post Intervention Pain Location/Orientation Location: Generalized   Post Intervention POSS 1   Response to Interventions effective     Follow up 650 mg Acetaminophen was effective. Pt observed resting in bed. No sign of pain or discomfort. Breathing calm and even. No SOB or labored breathing. Will continue to monitor. Safety checks continued.

## 2025-03-10 NOTE — TREATMENT TEAM
03/10/25 0403   Pain Assessment   Pain Assessment Tool 0-10   Pain Score 5   Pain Location/Orientation Location: Generalized   Pain Radiating Towards no   Pain Onset/Description Onset: Ongoing   Effect of Pain on Daily Activities mood   Patient's Stated Pain Goal No pain   Hospital Pain Intervention(s) Medication (See MAR)   Multiple Pain Sites No   POSS Assessment   [place] Opioid-Induced Sedation Scale (POSS) 1     650 mg Acetaminophen administered as ordered per pt's request. Will continued to monitor. Safety checks continued.

## 2025-03-10 NOTE — PROGRESS NOTES
03/10/25    Team Meeting   Meeting Type Daily Rounds   Team Members Present   Team Members Present Physician;Nurse;;Occupational Therapist   Physician Team Member Eagle Fierro MD   Nursing Team Member Ernie WATERS   Social Work Team Member Horacio CALVIN   OT Team Member Gino HAND   Patient/Family Present   Patient Present No   Patient's Family Present No   201, denies all, less disorganized, visible, med complaint, paranoid, suspicious, brightens on approach, no d/c dtae med adjustments

## 2025-03-10 NOTE — PROGRESS NOTES
"Progress Note - Brisa Nick 59 y.o. female MRN: [id number]    Unit/Bed#: [facility] 205-01 Encounter: [id number]        Subjective:   Patient seen and examined at bedside after reviewing the chart and discussing the case with the caring staff.      Patient examined at bedside.  Patient had several episodes of loose bowel movement yesterday and 1 episode this morning.  Patient does not want anything for now.    Physical Exam   Vitals: Blood pressure 117/58, pulse 90, temperature 97.5 °F (36.4 °C), temperature source Temporal, resp. rate 18, height 5' 3\" (1.6 m), weight 74.1 kg (163 lb 6.4 oz), SpO2 95%.,Body mass index is 28.95 kg/m².  Constitutional: Patient in no acute distress.  HEENT: PERR, EOMI, MMM.  Cardiovascular: Normal rate and regular rhythm.    Pulmonary/Chest: Effort normal and breath sounds normal.   Abdomen: Soft, + BS, NT.    Assessment/Plan:  Brisa Nick is a(n) 59 y.o. year old female with bipolar affective disorder.     Asthma.  Patient is on Singulair 10 mg daily.  Albuterol inhaler as needed.  Chronic pain syndrome.  Involving lower back pain and bilateral hips.  Patient follows with pain management on outpt basis and on Butrans patch weekly.  Tylenol as needed.  Nicotine dependence.  NRT.  Alcohol abuse.  Patient started thiamine, folic acid and multivitamin.  Insomnia.  Patient is on melatonin 3 mg at bedtime.  Vitamin D deficiency.  Continue vitamin D supplement.  Dry eyes.  Patient is on artificial tears twice daily.  Vitamin B12 deficiency.  Pt started on vitamin B12 500 mcg daily.   Diarrhea.  Hold Senokot-S and MiraLAX daily.  I put the patient on Lomotil 4 times daily as needed.  GERD.  I will put the patient on Protonix 40 mg daily.  Dry skin.  Apply ammonium lactate twice daily.  Overgrown toenails.  Podiatry consulted.  "

## 2025-03-10 NOTE — NURSING NOTE
Spoke w/ Pt's daughter Meeta Nick who is requesting call back from CM regarding Pt's [place] plan.  [phone]. Pt would like to be more involved in care if possible. She expresses concern than her mother may still not be at baseline, and worry that she will be DC'd too soon. Staff offers reassurance that concerns will be forwarded to TX team. Call ended mutually and respectfully.

## 2025-03-10 NOTE — NURSING NOTE
"Pt calm and cooperative. Visible and social w/ peers. Bright and pleasant on approach. Currently denies all psych S+S's. Describes concern that she is \"talking too much\". Is not noted by staff to be hyper- verbal or overly bright .Reassurance provided. Pt otherwise  denies unmet needs. Takes medications as prescribed. Inquires about and describes feeling ready for DC. Q15 safety checks maintained. Will cont to provide support as needed.   "

## 2025-03-10 NOTE — PLAN OF CARE
Problem: Alteration in Thoughts and Perception  Goal: Verbalize thoughts and feelings  Description: Interventions:  - Promote a nonjudgmental and trusting relationship with the patient through active listening and therapeutic communication  - Assess patient's level of functioning, behavior and potential for risk  - Engage patient in 1 on 1 interactions  - Encourage patient to express fears, feelings, frustrations, and discuss symptoms    - [place] patient to reality, help patient recognize reality-based thinking   - Administer medications as ordered and assess for potential side effects  - Provide the patient education related to the signs and symptoms of the illness and desired effects of prescribed medications  Outcome: Progressing     Problem: Ineffective Coping  Goal: Free from restraint events  Description: - Utilize least restrictive measures   - Provide behavioral interventions   - Redirect inappropriate behaviors   Outcome: Progressing     Problem: Risk for Self Injury/Neglect  Goal: Refrain from harming self  Description: Interventions:  - Monitor patient closely, per order  - Develop a trusting relationship  - Supervise medication ingestion, monitor effects and side effects   Outcome: Progressing

## 2025-03-10 NOTE — NURSING NOTE
Pt was visible on the unit and social with peers. Compliant with medication. Pleasant and cooperative with staff during care. Denied SI, HI, AVH, depression, or anxiety. Affect was appropriate to circumstance. Eye contact was good. Speech pattern was normal and relaxed. Made no c/o pain or discomfort. Safety checks continued.

## 2025-03-10 NOTE — PROGRESS NOTES
Progress Note - Behavioral Health     Brisa Nick 59 y.o. female MRN: [id number]   Unit/Bed#: [facility] 205-01 Encounter: [id number]    Behavior over the last 24 hours: improving.     Brisa seen today, per staff report has been calm cooperative and visible on the unit.  The patient is described as bright and pleasant on approach with good appetite.  Remains compliant with her medication and assessments.  She has been able to maintain a linear reality based conversation.  Attends groups.  Denies any unmet needs.  Safety checks have been maintained.    The patient was seen by me for the first time.  She is a 59-year-old female with a history of bipolar disorder and PTSD who was admitted under a 302 commitment.  The patient was exhibiting mood swings and erratic behaviors delusional thoughts disorganized thinking.  She believed that she was hurting someone and expressed fears that she might have killed someone.  The patient was started on Abilify and lamotrigine.    The patient reports that she is starting to do better.  She says that her moods are better.  She is not reporting any irritable or elated moods.  She says that she has been she is able to concentrate.  She says that she is not having any depressed moods.  She says that a lot of things she does not remember.  She is not reporting any thoughts of self-harm or harm to others.  No aggression towards others have been reported.         Sleep: slept better  Appetite: normal  Medication side effects: No side effects are reported.      Mental Status Evaluation:    Appearance:  age appropriate, casually dressed, adequate grooming   Behavior:  pleasant, cooperative, calm   Speech:  normal rate, normal volume, normal pitch   Mood:  improved   Affect:  normal range and intensity   Thought Process:  logical, coherent, goal directed   Associations: intact associations   Thought Content:  no overt delusions   Perceptual Disturbances: no auditory hallucinations, no visual  hallucinations   Risk Potential: Suicidal ideation - None at present  Homicidal ideation - None at present   Sensorium:  oriented to person, place, and time/date   Memory:  recent and remote memory grossly intact   Consciousness:  alert and awake   Attention: attention span and concentration are age appropriate   Insight:  limited   Judgment: fair   Gait/Station: normal gait/station   Motor Activity: no abnormal movements     Vital signs in last 24 hours:    Temp:  [97.5 °F (36.4 °C)-98.3 °F (36.8 °C)] 97.5 °F (36.4 °C)  HR:  [] 90  BP: (113-122)/(58) 117/58  Resp:  [18] 18  SpO2:  [92 %-95 %] 95 %  O2 Device: None (Room air)    Laboratory results: I have personally reviewed all pertinent laboratory/tests results.        Assessment & Plan   Principal Problem:    Bipolar affective disorder, depressed, severe, with psychotic behavior (HCC)  Active Problems:    Degenerative disc disease, cervical    Migraine headache    Fibromyalgia    Posttraumatic stress disorder    Crohn's colitis (HCC)    GERD (gastroesophageal reflux disease)    Moderate protein-calorie malnutrition (HCC)    Recommended Treatment:     Planned medication and treatment changes:      All current active medications have been reviewed  Encourage group therapy, milieu therapy and occupational therapy  Behavioral Health checks for safety monitoring    Advised to increase the Abilify to 10 mg daily and decrease the Lamictal to 50 mg in the morning and 100 mg at bedtime.  Discontinue Ativan 0.5 mg.  Discussed about the importance of staying on the medication.  Side effects risk benefits were discussed with the patient and she is agreeable to take the medication.  She also expressed understanding of the side effects risk and benefits.  Current Facility-Administered Medications   Medication Dose Route Frequency Provider Last Rate    acetaminophen  650 mg Oral Q4H PRN Mary Fernandez MD      acetaminophen  650 mg Oral Q4H PRCRISTOBAL Fernandez MD       acetaminophen  975 mg Oral Q6H PRN Mary Fernandez MD      albuterol  2 puff Inhalation Q4H PRN Brad Dailey MD      aluminum-magnesium hydroxide-simethicone  30 mL Oral Q4H PRN Mary Fernandez MD      ammonium lactate   Topical BID Evelin Rosado PA-C      ARIPiprazole  5 mg Oral Daily Khoi Beltran MD      Artificial Tears  1 drop Both Eyes Q12H [facility] Brad Dailey MD      buprenorphine  1 patch Transdermal Q7 Days Brad Dailey MD      calcium carbonate  500 mg Oral TID PRN DIANE OsorioC      cholecalciferol  2,000 Units Oral Daily Mary Fernandez MD      cyanocobalamin  500 mcg Oral Daily Evelin Rosado PA-C      folic acid  1 mg Oral Daily Brad Dailey MD      haloperidol lactate  5 mg Intramuscular Q4H PRN Max 4/day Mary Fernandez MD      hydrOXYzine HCL  25 mg Oral Q6H PRN Max 4/day Mary Fernandez MD      hydrOXYzine HCL  50 mg Oral Q6H PRN Max 4/day Mary Fernandez MD      lamoTRIgine  100 mg Oral BID Khoi Beltran MD      LORazepam  0.5 mg Oral Q8H PRN Khoi Beltran MD      LORazepam  0.5 mg Oral HS Khoi Beltran MD      melatonin  3 mg Oral HS Mary Fernandez MD      montelukast  10 mg Oral Daily Brad Dailey MD      multivitamin-minerals  1 tablet Oral Daily Brad Dailey MD      nicotine  1 patch Transdermal Daily Brad Dailey MD      nicotine polacrilex  4 mg Oral Q2H PRN Mary Fernandez MD      OLANZapine  5 mg Oral TID PRN Khoi Beltran MD      propranolol  5 mg Oral Q8H PRN Mary Fernandez MD      risperiDONE  0.5 mg Oral Q4H PRN Max 6/day Khoi Beltran MD      risperiDONE  1 mg Oral Q4H PRN Max 3/day Khoi Beltran MD      thiamine  100 mg Oral Daily Brad Dailey MD      traZODone  50 mg Oral HS PRN Khoi Beltran MD      traZODone  50 mg Oral HS Khoi Beltran MD         Risks / Benefits of Treatment:    Risks, benefits, and possible side effects of medications explained to patient and patient verbalizes understanding and agreement for treatment.    Counseling /  Coordination of Care:    Total floor / unit time spent today 25 minutes. Greater than 50% of total time was spent with the patient and / or family counseling and / or coordination of care. A description of counseling / coordination of care:  Patient's progress discussed with staff in treatment team meeting.  Discussed about her medications.  Reviewed her symptoms.  Discussed about recognizing her behaviors and importance of staying on the medication.    Cody Guillermo MD 03/10/25  Expressed fears that

## 2025-03-11 PROCEDURE — 99232 SBSQ HOSP IP/OBS MODERATE 35: CPT | Performed by: PSYCHIATRY & NEUROLOGY

## 2025-03-11 RX ADMIN — LAMOTRIGINE 100 MG: 100 TABLET ORAL at 21:33

## 2025-03-11 RX ADMIN — ACETAMINOPHEN 975 MG: 325 TABLET ORAL at 21:33

## 2025-03-11 RX ADMIN — Medication 3 MG: at 21:33

## 2025-03-11 RX ADMIN — CYANOCOBALAMIN TAB 500 MCG 500 MCG: 500 TAB at 08:30

## 2025-03-11 RX ADMIN — LAMOTRIGINE 50 MG: 25 TABLET ORAL at 08:30

## 2025-03-11 RX ADMIN — PANTOPRAZOLE SODIUM 40 MG: 40 TABLET, DELAYED RELEASE ORAL at 06:08

## 2025-03-11 RX ADMIN — THIAMINE HCL TAB 100 MG 100 MG: 100 TAB at 08:30

## 2025-03-11 RX ADMIN — ARIPIPRAZOLE 10 MG: 10 TABLET ORAL at 08:30

## 2025-03-11 RX ADMIN — DEXTRAN 70, GLYCERIN, HYPROMELLOSE 1 DROP: 1; 2; 3 SOLUTION/ DROPS OPHTHALMIC at 08:33

## 2025-03-11 RX ADMIN — LORAZEPAM 0.5 MG: 0.5 TABLET ORAL at 00:19

## 2025-03-11 RX ADMIN — CHOLECALCIFEROL TAB 25 MCG (1000 UNIT) 2000 UNITS: 25 TAB at 08:30

## 2025-03-11 RX ADMIN — MONTELUKAST 10 MG: 10 TABLET, FILM COATED ORAL at 08:30

## 2025-03-11 RX ADMIN — Medication 1 APPLICATION: at 17:30

## 2025-03-11 RX ADMIN — Medication 1 APPLICATION: at 09:52

## 2025-03-11 RX ADMIN — Medication 1 TABLET: at 08:30

## 2025-03-11 RX ADMIN — FOLIC ACID 1 MG: 1 TABLET ORAL at 08:30

## 2025-03-11 NOTE — TREATMENT TEAM
03/11/25 0019   [place] Anxiety Scale   Anxious Mood 3   Tension 3   Fears 3   Insomnia 3   Intellectual 3   Depressed Mood 3   Somatic Complaints: Muscular 0   Somatic Complaints: Sensory 0   Cardiovascular Symptoms 0   Respiratory Symptoms 2   Gastrointestinal Symptoms 0   Genitourinary Symptoms 2   Autonomic Symptoms 3   Behavior at Interview 3   [place] Anxiety Score 28     Patient administered 0.5 mg ativan for severe anxiety related to recurrent flashbacks. Since receiving the ativan the patient has relaxed enough to fall asleep, no signs of anxiety noted. Q15 minute checks ongoing

## 2025-03-11 NOTE — TREATMENT TEAM
03/10/25 2056   Pain Assessment   Pain Assessment Tool 0-10   Pain Score 9   Pain Location/Orientation Location: Generalized   Pain Radiating Towards no   Pain Onset/Description Onset: Ongoing   Effect of Pain on Daily Activities mood   Patient's Stated Pain Goal No pain   Hospital Pain Intervention(s) Medication (See MAR)   Multiple Pain Sites No   POSS Assessment   [place] Opioid-Induced Sedation Scale (POSS) 1     975 mg Acetaminophen administered as ordered per pt's request. Will continue to monitor. Safety checks continued.

## 2025-03-11 NOTE — PROGRESS NOTES
03/11/25   Team Meeting   Meeting Type Daily Rounds   Team Members Present   Team Members Present Physician;Nurse;Occupational Therapist;   Physician Team Member MD Dalia Burrell MD   Nursing Team Member Ernie WATERS   Social Work Team Member Horacio CALVIN   OT Team Member Gino HAND   Patient/Family Present   Patient Present No   Patient's Family Present No   201, visible, social, med complaint, less disorganized, linear in conversation, no d/c date monitor med adjustment

## 2025-03-11 NOTE — TREATMENT TEAM
03/10/25 0495   Pain Assessment Post Intervention   Reason Not Indicated Sleeping / Easy to arouse   Pain Assessment Tool Used: 0-10   Post Intervention Pain Score No Pain   Post Intervention Pain Location/Orientation Location: Generalized   Post Intervention POSS S   Response to Interventions effective     Follow up 975 mg Acetaminophen, effective. Pt observed resting in bed. No sign of pain or discomfort. Breathing calm and even. No SOB or labored breathing. Will continue to monitor. Safety checks continued.

## 2025-03-11 NOTE — NURSING NOTE
Patient visible in milieu with peers, medication compliant and cooperative. Patient denied all, no anxiety/depression, denies SI/HI and hallucinations. Patient attends group therapy and interacts appropriately with peers. Continued care and safety rounds in progress.

## 2025-03-11 NOTE — PROGRESS NOTES
"Progress Note - Brisa Nick 59 y.o. female MRN: [id number]    Unit/Bed#: [facility] 205-01 Encounter: [id number]        Subjective:   Patient seen and examined at bedside after reviewing the chart and discussing the case with the caring staff.      Patient examined at bedside.  Patient without any acute symptoms.     Physical Exam   Vitals: Blood pressure 113/64, pulse 77, temperature 97.5 °F (36.4 °C), temperature source Temporal, resp. rate 18, height 5' 3\" (1.6 m), weight 76.1 kg (167 lb 12.8 oz), SpO2 95%.,Body mass index is 29.72 kg/m².  Constitutional: Patient in no acute distress.  HEENT: PERR, EOMI, MMM.  Cardiovascular: Normal rate and regular rhythm.    Pulmonary/Chest: Effort normal and breath sounds normal.   Abdomen: Soft, + BS, NT.    Assessment/Plan:  Brisa Nick is a(n) 59 y.o. year old female with bipolar affective disorder.     Asthma.  Patient is on Singulair 10 mg daily.  Albuterol inhaler as needed.  Chronic pain syndrome.  Involving lower back and bilateral hips.  Patient follows with pain management on outpt basis and on Butrans patch weekly.  Tylenol as needed.  Nicotine dependence.  NRT.  Alcohol abuse.  Patient started thiamine, folic acid and multivitamin.  Insomnia.  Patient is on melatonin 3 mg at bedtime.  Vitamin D deficiency.  Continue vitamin D supplement.  Dry eyes.  Patient is on artificial tears twice daily.  Vitamin B12 deficiency.  Pt started on vitamin B12 500 mcg daily.   GERD. Patient started on Protonix 40 mg daily.  Dry skin.  Apply ammonium lactate twice daily.  Overgrown toenails.  Podiatry consulted.  Constipation.  Now with diarrhea.  Hold Senokot-S and MiraLAX daily. Given Lomotil x1 on 3/10.    The patient was discussed with Dr. Dailey and he is in agreement with the above note.    "

## 2025-03-11 NOTE — PROGRESS NOTES
Progress Note - Behavioral Health     Brisa Nick 59 y.o. female MRN: [id number]   Unit/Bed#: [facility] 205-01 Encounter: [id number]    Behavior over the last 24 hours: improved.     Brisa seen today, per staff report has been visible on the unit.  The patient has been compliant with the medication and cooperative with staff.  She has not reported any suicidal ideas, homicidal ideas, AVH, depression or anxiety.  Appearance and hygiene remained unremarkable.    I spoke to the patient.  The patient reported that she has been feeling tired and at times sleepy.  She says that otherwise her symptoms are better.  She believes that it is because of the recent changes in her medication.  The patient however has shown decrease in symptoms of elena.  She reports no racing thoughts and she says that mood is much more stable.  She is not reporting any paranoid thoughts or hallucination.  Occasional irritability is noted regarding the medication.  Saying that she was going to be on Vraylar before coming here.  She also feels that Lamictal has not been helpful.  Lamictal is being gradually decreased.  The patient is not reporting any depressed moods or thoughts of self-harm or harm to others.  She stated that she did sleep well last night.         Sleep: improved  Appetite: normal  Medication side effects: Complains of being tired and drowsy.      Mental Status Evaluation:    Appearance:  age appropriate, dressed appropriately   Behavior:  pleasant, cooperative   Speech:  normal rate, normal volume, normal pitch   Mood:  improved   Affect:  appropriate   Thought Process:  organized, logical, coherent, goal directed   Associations: intact associations   Thought Content:  no overt delusions   Perceptual Disturbances: no auditory hallucinations, no visual hallucinations   Risk Potential: Suicidal ideation - None at present  Homicidal ideation - None at present   Sensorium:  oriented to person, place, and time/date   Memory:  recent and  remote memory grossly intact   Consciousness:  alert and awake   Attention: attention span and concentration are age appropriate   Insight:  fair   Judgment: fair   Gait/Station: normal gait/station   Motor Activity: no abnormal movements     Vital signs in last 24 hours:    Temp:  [97.5 °F (36.4 °C)-98.1 °F (36.7 °C)] 97.5 °F (36.4 °C)  HR:  [77-86] 77  BP: (113-135)/(59-71) 113/64  Resp:  [18] 18  SpO2:  [95 %-97 %] 95 %  O2 Device: None (Room air)    Laboratory results: I have personally reviewed all pertinent laboratory/tests results.        Assessment & Plan   Principal Problem:    Bipolar affective disorder, depressed, severe, with psychotic behavior (HCC)  Active Problems:    Degenerative disc disease, cervical    Migraine headache    Fibromyalgia    Posttraumatic stress disorder    Crohn's colitis (HCC)    GERD (gastroesophageal reflux disease)    Moderate protein-calorie malnutrition (HCC)    Recommended Treatment:     Planned medication and treatment changes:      All current active medications have been reviewed  Encourage group therapy, milieu therapy and occupational therapy  Behavioral Health checks for safety monitoring  I have reviewed her medications and we will discontinue trazodone which might be making her more drowsy along with other medications.  She is also on melatonin 3 mg.  Lamictal will be gradually tapered off.  Currently she is on 50 mg in the morning and 100 mg daily which was changed yesterday and decreased from 200 mg daily.  Current Facility-Administered Medications   Medication Dose Route Frequency Provider Last Rate    acetaminophen  650 mg Oral Q4H PRN Mary Fernandez MD      acetaminophen  650 mg Oral Q4H PRN Mary Fernandez MD      acetaminophen  975 mg Oral Q6H PRN Mary Fernandez MD      albuterol  2 puff Inhalation Q4H PRN Brad Dailey MD      aluminum-magnesium hydroxide-simethicone  30 mL Oral Q4H PRN Mary Fernandez MD      ammonium lactate   Topical BID Evelin L  ANNI Rosado      ARIPiprazole  10 mg Oral Daily Cody Guillermo MD      Artificial Tears  1 drop Both Eyes Q12H [facility] Brad Dailey MD      buprenorphine  1 patch Transdermal Q7 Days Brad Dailey MD      calcium carbonate  500 mg Oral TID PRN Evelin Rosado PA-C      cholecalciferol  2,000 Units Oral Daily Mary Fernandez MD      cyanocobalamin  500 mcg Oral Daily Evelin Rosado PA-C      folic acid  1 mg Oral Daily Brad Dailey MD      haloperidol lactate  5 mg Intramuscular Q4H PRN Max 4/day Mary Fernandez MD      hydrOXYzine HCL  25 mg Oral Q6H PRN Max 4/day Mary Fernandez MD      hydrOXYzine HCL  50 mg Oral Q6H PRN Max 4/day Mary Fernandez MD      lamoTRIgine  100 mg Oral HS Cody Guillermo MD      lamoTRIgine  50 mg Oral Daily Cody Guillermo MD      LORazepam  0.5 mg Oral Q8H PRN Khoi Beltran MD      melatonin  3 mg Oral HS Mary Fernandez MD      montelukast  10 mg Oral Daily Brad Dailey MD      multivitamin-minerals  1 tablet Oral Daily Brad Dailey MD      nicotine  1 patch Transdermal Daily Brad Dailey MD      nicotine polacrilex  4 mg Oral Q2H PRN Mary Fernandez MD      OLANZapine  5 mg Oral TID PRN Khoi Beltran MD      pantoprazole  40 mg Oral Early Morning Brad Dailey MD      propranolol  5 mg Oral Q8H PRN Mary Fernandez MD      risperiDONE  0.5 mg Oral Q4H PRN Max 6/day Khoi Beltran MD      risperiDONE  1 mg Oral Q4H PRN Max 3/day Khoi Beltran MD      thiamine  100 mg Oral Daily Brad Dailey MD      traZODone  50 mg Oral HS PRN Khoi Beltran MD         Risks / Benefits of Treatment:    Risks, benefits, and possible side effects of medications explained to patient and patient verbalizes understanding and agreement for treatment.    Counseling / Coordination of Care:    Total floor / unit time spent today 15 minutes. Greater than 50% of total time was spent with the patient and / or family counseling and / or coordination of care. A description of counseling /  coordination of care:  Patient's progress discussed with staff in treatment team meeting.  Discussed with the patient that I will review the medication and the changes will be made.    Cody Guillermo MD 03/11/25

## 2025-03-11 NOTE — NURSING NOTE
Pt was visible on the unit, withdrawn to self. Compliant with medication. Cooperative with staff during care. Denied SI, HI, AVH, depression, or anxiety. Affect was appropriate to circumstance. Eye contact is good. Speech pattern was normal and relaxed. Appearance and hygiene was unremarkable. Made no c/o pain or discomfort. Safety checks continued.

## 2025-03-12 PROCEDURE — 99232 SBSQ HOSP IP/OBS MODERATE 35: CPT | Performed by: PSYCHIATRY & NEUROLOGY

## 2025-03-12 RX ADMIN — DEXTRAN 70, GLYCERIN, HYPROMELLOSE 1 DROP: 1; 2; 3 SOLUTION/ DROPS OPHTHALMIC at 21:20

## 2025-03-12 RX ADMIN — THIAMINE HCL TAB 100 MG 100 MG: 100 TAB at 08:42

## 2025-03-12 RX ADMIN — PANTOPRAZOLE SODIUM 40 MG: 40 TABLET, DELAYED RELEASE ORAL at 05:32

## 2025-03-12 RX ADMIN — LAMOTRIGINE 100 MG: 100 TABLET ORAL at 21:19

## 2025-03-12 RX ADMIN — CHOLECALCIFEROL TAB 25 MCG (1000 UNIT) 2000 UNITS: 25 TAB at 08:42

## 2025-03-12 RX ADMIN — FOLIC ACID 1 MG: 1 TABLET ORAL at 08:42

## 2025-03-12 RX ADMIN — ACETAMINOPHEN 975 MG: 325 TABLET ORAL at 21:23

## 2025-03-12 RX ADMIN — Medication 3 MG: at 21:19

## 2025-03-12 RX ADMIN — CYANOCOBALAMIN TAB 500 MCG 500 MCG: 500 TAB at 08:42

## 2025-03-12 RX ADMIN — MONTELUKAST 10 MG: 10 TABLET, FILM COATED ORAL at 08:43

## 2025-03-12 RX ADMIN — HYDROXYZINE HYDROCHLORIDE 25 MG: 25 TABLET, FILM COATED ORAL at 03:04

## 2025-03-12 RX ADMIN — LAMOTRIGINE 50 MG: 25 TABLET ORAL at 08:42

## 2025-03-12 RX ADMIN — ARIPIPRAZOLE 10 MG: 10 TABLET ORAL at 08:43

## 2025-03-12 RX ADMIN — Medication 1 TABLET: at 08:42

## 2025-03-12 NOTE — NURSING NOTE
Received patient at 1900. Out in community.  Social with select peers. Pleasant during evening assessment. Denied depression and anxiety. Also denied any suicidal or homicidal ideations.  Safety maintained via clutter-free environment, ID band, and given non-skid socks.  No change in medical condition.  Compliant with medications and snack.  Showered this evening. Hygiene is good.  Medication education given.  Care Plan reviewed and amended. Maintained on q 15 minute checks. Will continue to monitor.

## 2025-03-12 NOTE — PROGRESS NOTES
"Progress Note - Brisa Nick 59 y.o. female MRN: [id number]    Unit/Bed#: [facility] 205-01 Encounter: [id number]        Subjective:   Patient seen and examined at bedside after reviewing the chart and discussing the case with the caring staff.      Patient examined at bedside.  Patient without any acute symptoms.     Physical Exam   Vitals: Blood pressure 114/55, pulse 70, temperature 97.5 °F (36.4 °C), temperature source Temporal, resp. rate 18, height 5' 3\" (1.6 m), weight 76.1 kg (167 lb 12.8 oz), SpO2 96%.,Body mass index is 29.72 kg/m².  Constitutional: Patient in no acute distress.  HEENT: PERR, EOMI, MMM.  Cardiovascular: Normal rate and regular rhythm.    Pulmonary/Chest: Effort normal and breath sounds normal.   Abdomen: Soft, + BS, NT.    Assessment/Plan:  Brisa Nick is a(n) 59 y.o. year old female with bipolar affective disorder.     Asthma.  Patient is on Singulair 10 mg daily.  Albuterol inhaler as needed.  Chronic pain syndrome.  Involving lower back and bilateral hips.  Patient follows with pain management on outpt basis and on Butrans patch weekly.  Tylenol as needed.  Nicotine dependence.  NRT.  Alcohol abuse.  Patient started thiamine, folic acid and multivitamin.  Insomnia.  Patient is on melatonin 3 mg at bedtime.  Vitamin D deficiency.  Continue vitamin D supplement.  Dry eyes.  Patient is on artificial tears twice daily.  Vitamin B12 deficiency.  Pt started on vitamin B12 500 mcg daily.   GERD. Patient started on Protonix 40 mg daily.  Dry skin.  Apply ammonium lactate twice daily.  Overgrown toenails.  Podiatry consulted.  Constipation.  Now with diarrhea.  Hold Senokot-S and MiraLAX daily.  Given Lomotil x1 on 3/10.  Malnutrition.  Nutrition following.  Adult Malnutrition type: Chronic illness.  Adult Degree of Malnutrition: Malnutrition of moderate degree.  Malnutrition Characteristics: Inadequate energy, Weight loss.    The patient was discussed with Dr. Dailey and he is in agreement with " the above note.

## 2025-03-12 NOTE — NURSING NOTE
Slept well during most q 15 minute safety checks. Awake at 0204 with mild anxiety (see previous note), but returned promptly to bed and returned to sleep.  No respiratory distress or changes in medical condition.  No suicidal ideations noted.  Safety maintained via clutter-free environment, ID band, and given non-skid socks.  Will continue to monitor.

## 2025-03-12 NOTE — NURSING NOTE
Pt visible on unit and social with select peers. Pt is calm, pleasant, and cooperative. She brightens on approach. Pt denies anxiety and depression, SI/HI/AVH. Pt states she believes her Abilify is making her feel tired throughout the day. Continuous monitoring maintained.

## 2025-03-12 NOTE — NUTRITION
03/12/25 1322   Biochemical Data,Medical Tests, and Procedures   Biochemical Data/Medical Tests/Procedures Lab values reviewed;Meds reviewed   Labs (Comment) 3/7/2025 labs reviewed, WNL   Meds (Comment) Ammonium lactate, Abilify, vitamin D3, vitamin B12, folic acid, Lamictal, melatonin, MVI, Protonix, thiamine   Nutrition-Focused Physical Exam   Nutrition-Focused Physical Exam Findings RN skin assessment reviewed;No edema documented;No skin issues documented   Medical-Related Concerns PMH reviewed   Adequacy of Intake   Nutrition Modality PO   Feeding Route   PO Independent   Current PO Intake   Current Diet Order Regular diet, thin liquids   Nutrition Supplements Ensure Plus High Protein  (Once daily)   Current Meal Intake %   Intake Supplements 0-25%   Estimated calorie intake compared to estimated need Nutrient needs are met   PES Statement   Problem Continue previous diagnosis   Recommendations/Interventions   Malnutrition/BMI Present Yes  (As previously documented)   Summary Nutrition follow-up assessment.  Continues to be oriented to person only.  Prescribed a regular diet, thin liquids.  Ensure vanilla once daily.  Meal completion mostly 100%.  Nutrient needs are met.  Patient reports having a good appetite.  Denies dysphagia.  Reports experiencing no GI upset today.  She has no nutrition questions.  RD continues to follow.   Interventions/Recommendations Continue current diet order;Supplement discontinue;Monitor I & O's   Education Assessment   Education Education not indicated at this time   Patient Nutrition Goals   Goal Avoid weight loss;Meet PO needs   Goal Status Met;Extended   Timeframe to complete goal by next f/u   Nutrition Complexity Risk   Nutrition complexity level Moderate risk   Follow up date 03/21/25

## 2025-03-12 NOTE — PROGRESS NOTES
Progress Note - Behavioral Health     Brisa Nick 59 y.o. female MRN: [id number]   Unit/Bed#: [facility] 205-01 Encounter: [id number]    Behavior over the last 24 hours: improved.     Brisa seen today, per staff report has been visible on the unit.  The staff reports that she has expressed mild anxiety but went back to her bed and slept.  She denied any depressed mood SI, HI, AVH.  Remains med compliant.    The patient reports that she is still feeling little tired but overall doing well but still feels tired.  The patient reports that she is not experiencing any racing thoughts or any elated moods grandiose thoughts.  The patient still believes that it could be her Abilify.  Discussed with her that trazodone was more likely to cause more drowsiness.  The patient does not appear to be groggy or tired.  Denies any other symptoms.  She has been participating in program.  Denies any auditory or visual hallucination.  No elated moods or grandiose thoughts are reported.         Sleep: normal  Appetite: normal  Medication side effects: Reports some tiredness.  No other side effects are reported.      Mental Status Evaluation:    Appearance:  age appropriate, casually dressed, dressed appropriately   Behavior:  normal, pleasant, cooperative   Speech:  normal rate, normal volume, normal pitch   Mood:  anxious   Affect:  appropriate   Thought Process:  organized, logical, coherent, goal directed   Associations: intact associations   Thought Content:  no overt delusions   Perceptual Disturbances: no auditory hallucinations, no visual hallucinations   Risk Potential: Suicidal ideation - None at present  Homicidal ideation - None at present   Sensorium:  oriented to person, place, and time/date   Memory:  recent and remote memory grossly intact   Consciousness:  alert and awake   Attention: attention span and concentration are age appropriate   Insight:  fair   Judgment: fair   Gait/Station: normal gait/station   Motor Activity: no  abnormal movements     Vital signs in last 24 hours:    Temp:  [97.5 °F (36.4 °C)-98.2 °F (36.8 °C)] 97.5 °F (36.4 °C)  HR:  [70-84] 70  BP: (114-123)/(55-81) 114/55  Resp:  [18] 18  SpO2:  [96 %] 96 %  O2 Device: None (Room air)    Laboratory results: I have personally reviewed all pertinent laboratory/tests results.  Most Recent Labs:   Lab Results   Component Value Date    WBC 8.67 03/07/2025    RBC 4.73 03/07/2025    HGB 13.7 03/07/2025    HCT 42.6 03/07/2025     03/07/2025    RDW 12.4 03/07/2025    NEUTROABS 5.25 03/07/2025    SODIUM 139 03/07/2025    K 4.0 03/07/2025     03/07/2025    CO2 31 03/07/2025    BUN 19 03/07/2025    CREATININE 0.72 03/07/2025    GLUC 101 03/07/2025    GLUF 101 (H) 03/07/2025    CALCIUM 9.4 03/07/2025    AST 16 03/07/2025    ALT 16 03/07/2025    ALKPHOS 49 03/07/2025    TP 6.2 (L) 03/07/2025    ALB 3.9 03/07/2025    TBILI 0.37 03/07/2025    CHOLESTEROL 146 03/04/2025    HDL 37 (L) 03/04/2025    TRIG 128 03/04/2025    LDLCALC 83 03/04/2025    NONHDLC 109 03/04/2025    BNA0PWSVNPFS 0.326 (L) 03/02/2025    FREET4 0.97 03/02/2025    HGBA1C 5.8 (H) 01/21/2025     01/21/2025           Assessment & Plan   Principal Problem:    Bipolar affective disorder, depressed, severe, with psychotic behavior (HCC)  Active Problems:    Degenerative disc disease, cervical    Migraine headache    Fibromyalgia    Posttraumatic stress disorder    Crohn's colitis (HCC)    GERD (gastroesophageal reflux disease)    Moderate protein-calorie malnutrition (HCC)    Recommended Treatment:     Planned medication and treatment changes:      All current active medications have been reviewed  Encourage group therapy, milieu therapy and occupational therapy  Behavioral Health checks for safety monitoring  Continue on current medication no changes are recommended.  Current Facility-Administered Medications   Medication Dose Route Frequency Provider Last Rate    acetaminophen  650 mg Oral Q4H PRN Mary  PEARL Fernandez MD      acetaminophen  650 mg Oral Q4H PRN Mary Fernandez MD      acetaminophen  975 mg Oral Q6H PRN Mary Fernandez MD      albuterol  2 puff Inhalation Q4H PRN Brad Dailey MD      aluminum-magnesium hydroxide-simethicone  30 mL Oral Q4H PRN Mary Fernandez MD      ammonium lactate   Topical BID TYESHA Osorio-C      ARIPiprazole  10 mg Oral Daily Cody Guillermo MD      Artificial Tears  1 drop Both Eyes Q12H [facility] Brad Dailey MD      buprenorphine  1 patch Transdermal Q7 Days Brad Dailey MD      calcium carbonate  500 mg Oral TID PRN DIANE OsorioC      cholecalciferol  2,000 Units Oral Daily Mary Fernandez MD      cyanocobalamin  500 mcg Oral Daily Evelin Rosado, PA-C      folic acid  1 mg Oral Daily Brad Dailey MD      haloperidol lactate  5 mg Intramuscular Q4H PRN Max 4/day Mary Fernandez MD      hydrOXYzine HCL  25 mg Oral Q6H PRN Max 4/day Mary Fernandez MD      hydrOXYzine HCL  50 mg Oral Q6H PRN Max 4/day Mary Fernandez MD      lamoTRIgine  100 mg Oral HS Cody Guillermo MD      lamoTRIgine  50 mg Oral Daily Cody Guillermo MD      LORazepam  0.5 mg Oral Q8H PRN Khoi Beltran MD      melatonin  3 mg Oral HS Mary Fernandez MD      montelukast  10 mg Oral Daily Brda Dailey MD      multivitamin-minerals  1 tablet Oral Daily Brad Dailey MD      nicotine  1 patch Transdermal Daily Brad Dailey MD      nicotine polacrilex  4 mg Oral Q2H PRN Mary Fernandez MD      OLANZapine  5 mg Oral TID PRN Khoi Beltran MD      pantoprazole  40 mg Oral Early Morning Brad Dailey MD      propranolol  5 mg Oral Q8H PRN Mary Fernandez MD      risperiDONE  0.5 mg Oral Q4H PRN Max 6/day Khoi Beltran MD      risperiDONE  1 mg Oral Q4H PRN Max 3/day Khoi Beltran MD      thiamine  100 mg Oral Daily Brad Dailey MD      traZODone  50 mg Oral HS PRN Khoi Beltran MD         Risks / Benefits of Treatment:    Risks, benefits, and possible side effects of medications  explained to patient and patient verbalizes understanding and agreement for treatment.    Counseling / Coordination of Care:    Total floor / unit time spent today 15 minutes. Greater than 50% of total time was spent with the patient and / or family counseling and / or coordination of care. A description of counseling / coordination of care:  Patient's progress discussed with staff in treatment team meeting.    Cody Guillermo MD 03/12/25

## 2025-03-12 NOTE — PROGRESS NOTES
Progress Note - Behavioral Health     Brisa Nick 59 y.o. female MRN: [id number]   Unit/Bed#: [facility] 205-01 Encounter: [id number]    Behavior over the last 24 hours: improving.     Brisa seen today, per staff report has been visible on the unit.  The patient overall has been compliant with medication cooperative and denying any anxiety or depression.  Sometimes reports mild anxiety.  Attending group therapy interaction with peers is appropriate.    The patient was seen today for follow-up and he continues to do fairly well and says that he still has some anxiety and worried about his future plans.  He reported that he was at [facility] counseling services in [place] and then also at a counseling in [facility].  The patient reports that he could not remember them yesterday.  He says that he is willing to go for drug and alcohol treatment but he does not have all his papers.  He stated that they are in his car which is parked in [facility].  The patient reported that he did sleep well.  He denied that he is having any depressed moods or any suicidal thinking or hopelessness helplessness.  Denies any thoughts of harming others.  Reports no hallucination or paranoid thoughts.  Behavior has been appropriate in the unit.  He says that he has been taking his medication as prescribed.         Sleep: normal  Appetite: normal  Medication side effects: No side effects reported.      Mental Status Evaluation:    Appearance:  age appropriate, casually dressed   Behavior:  pleasant, cooperative   Speech:  normal rate, normal volume, normal pitch, pressured   Mood:  anxious   Affect:  appropriate   Thought Process:  organized, logical, coherent   Associations: intact associations   Thought Content:  no overt delusions   Perceptual Disturbances: no auditory hallucinations, no visual hallucinations   Risk Potential: Suicidal ideation - None at present  Homicidal ideation - None at present   Sensorium:  oriented to person, place,  and time/date   Memory:  recent and remote memory grossly intact   Consciousness:  alert and awake   Attention: attention span and concentration are age appropriate   Insight:  good   Judgment: improved   Gait/Station: normal gait/station   Motor Activity: no abnormal movements     Vital signs in last 24 hours:    Temp:  [97.5 °F (36.4 °C)-98.2 °F (36.8 °C)] 97.5 °F (36.4 °C)  HR:  [67-84] 70  BP: (104-123)/(51-81) 114/55  Resp:  [18] 18  SpO2:  [95 %-96 %] 96 %  O2 Device: None (Room air)    Laboratory results: I have personally reviewed all pertinent laboratory/tests results.  Most Recent Labs:   Lab Results   Component Value Date    WBC 8.67 03/07/2025    RBC 4.73 03/07/2025    HGB 13.7 03/07/2025    HCT 42.6 03/07/2025     03/07/2025    RDW 12.4 03/07/2025    NEUTROABS 5.25 03/07/2025    SODIUM 139 03/07/2025    K 4.0 03/07/2025     03/07/2025    CO2 31 03/07/2025    BUN 19 03/07/2025    CREATININE 0.72 03/07/2025    GLUC 101 03/07/2025    GLUF 101 (H) 03/07/2025    CALCIUM 9.4 03/07/2025    AST 16 03/07/2025    ALT 16 03/07/2025    ALKPHOS 49 03/07/2025    TP 6.2 (L) 03/07/2025    ALB 3.9 03/07/2025    TBILI 0.37 03/07/2025    CHOLESTEROL 146 03/04/2025    HDL 37 (L) 03/04/2025    TRIG 128 03/04/2025    LDLCALC 83 03/04/2025    NONHDLC 109 03/04/2025    [id number] 0.326 (L) 03/02/2025    FREET4 0.97 03/02/2025    HGBA1C 5.8 (H) 01/21/2025     01/21/2025           Assessment & Plan   Principal Problem:    Bipolar affective disorder, depressed, severe, with psychotic behavior (HCC)  Active Problems:    Degenerative disc disease, cervical    Migraine headache    Fibromyalgia    Posttraumatic stress disorder    Crohn's colitis (HCC)    GERD (gastroesophageal reflux disease)    Moderate protein-calorie malnutrition (HCC)    Recommended Treatment:     Planned medication and treatment changes:      All current active medications have been reviewed  Encourage group therapy, milieu therapy and  occupational therapy  Behavioral Health checks for safety monitoring  No changes in his medication are recommended at this time  Current Facility-Administered Medications   Medication Dose Route Frequency Provider Last Rate    acetaminophen  650 mg Oral Q4H PRN Mary Fernandez MD      acetaminophen  650 mg Oral Q4H PRN Mary Fernandez MD      acetaminophen  975 mg Oral Q6H PRN Mary Fernandez MD      albuterol  2 puff Inhalation Q4H PRN Brad Dailey MD      aluminum-magnesium hydroxide-simethicone  30 mL Oral Q4H PRN Mary Fernandez MD      ammonium lactate   Topical BID TYESHA Osorio-C      ARIPiprazole  10 mg Oral Daily Cody Guillermo MD      Artificial Tears  1 drop Both Eyes Q12H [facility] Brad Dailey MD      buprenorphine  1 patch Transdermal Q7 Days Andorran MD Asim      calcium carbonate  500 mg Oral TID PRN TYESHA Osorio-C      cholecalciferol  2,000 Units Oral Daily Mary Fernandez MD      cyanocobalamin  500 mcg Oral Daily TYESHA Osorio-C      folic acid  1 mg Oral Daily Andorran MD Asim      haloperidol lactate  5 mg Intramuscular Q4H PRN Max 4/day Mary Fernandez MD      hydrOXYzine HCL  25 mg Oral Q6H PRN Max 4/day Mary Fernandez MD      hydrOXYzine HCL  50 mg Oral Q6H PRN Max 4/day Mary Fernandez MD      lamoTRIgine  100 mg Oral HS Cody Guillermo MD      lamoTRIgine  50 mg Oral Daily Cody Guillermo MD      LORazepam  0.5 mg Oral Q8H PRN Khoi Beltran MD      melatonin  3 mg Oral HS Mary Fernandez MD      montelukast  10 mg Oral Daily Andorran MD Asim      multivitamin-minerals  1 tablet Oral Daily Andorran MD Asim      nicotine  1 patch Transdermal Daily Andorran MD Asim      nicotine polacrilex  4 mg Oral Q2H PRN Mary Fernandez MD      OLANZapine  5 mg Oral TID PRN Khoi Beltran MD      pantoprazole  40 mg Oral Early Morning Andorran MD Asim      propranolol  5 mg Oral Q8H PRN Mary Fernandez MD      risperiDONE  0.5 mg Oral Q4H PRN Max 6/day Khoi Beltran  MD      risperiDONE  1 mg Oral Q4H PRN Max 3/day Khoi Beltran MD      thiamine  100 mg Oral Daily Brad Dailey MD      traZODone  50 mg Oral HS PRN Khoi Beltran MD         Risks / Benefits of Treatment:    Risks, benefits, and possible side effects of medications explained to patient and patient verbalizes understanding and agreement for treatment.    Counseling / Coordination of Care:    Total floor / unit time spent today 15 minutes. Greater than 50% of total time was spent with the patient and / or family counseling and / or coordination of care. A description of counseling / coordination of care: Discussed about his discharge plans.  Discussed about how he is planning to get his papers.  The patient stated that he is going to discuss with the .  Reviewed his medication and reviewed his symptoms.    Cody Guillermo MD 03/12/25

## 2025-03-12 NOTE — TREATMENT TEAM
Patient complained of mild anxiety at 0304. Mckinney Scale 8. Given Atarax 25 mg PO at this time. Reassessed at 0404. Patient observed sleeping at this time. Mckinney Scale 0. Medication effective.   03/12/25 0404   Mckinney Anxiety Scale   Anxious Mood 0   Tension 0   Fears 0   Insomnia 0   Intellectual 0   Depressed Mood 0   Somatic Complaints: Muscular 0   Somatic Complaints: Sensory 0   Cardiovascular Symptoms 0   Respiratory Symptoms 0   Gastrointestinal Symptoms 0   Genitourinary Symptoms 0   Autonomic Symptoms 0   Behavior at Interview 0   Mckinney Anxiety Score 0

## 2025-03-12 NOTE — SOCIAL WORK
CM met with pt on unit.  She stated that she was feeling tired due to med changes specifically an increase

## 2025-03-12 NOTE — PLAN OF CARE
Problem: Alteration in Thoughts and Perception  Goal: Treatment Goal: Gain control of psychotic behaviors/thinking, reduce/eliminate presenting symptoms and demonstrate improved reality functioning upon discharge  Outcome: Progressing  Goal: Verbalize thoughts and feelings  Description: Interventions:  - Promote a nonjudgmental and trusting relationship with the patient through active listening and therapeutic communication  - Assess patient's level of functioning, behavior and potential for risk  - Engage patient in 1 on 1 interactions  - Encourage patient to express fears, feelings, frustrations, and discuss symptoms    - [place] patient to reality, help patient recognize reality-based thinking   - Administer medications as ordered and assess for potential side effects  - Provide the patient education related to the signs and symptoms of the illness and desired effects of prescribed medications  Outcome: Progressing  Goal: Refrain from acting on delusional thinking/internal stimuli  Description: Interventions:  - Monitor patient closely, per order   - Utilize least restrictive measures   - Set reasonable limits, give positive feedback for acceptable   - Administer medications as ordered and monitor of potential side effects  Outcome: Progressing  Goal: Agree to be compliant with medication regime, as prescribed and report medication side effects  Description: Interventions:  - Offer appropriate PRN medication and supervise ingestion; conduct AIMS, as needed   Outcome: Progressing  Goal: Attend and participate in unit activities, including therapeutic, recreational, and educational groups  Description: Interventions:  -Encourage Visitation and family involvement in care  Outcome: Progressing  Goal: Recognize dysfunctional thoughts, communicate reality-based thoughts at the time of discharge  Description: Interventions:  - Provide medication and psycho-education to assist patient in compliance and developing  insight into his/her illness   Outcome: Progressing  Goal: Complete daily ADLs, including personal hygiene independently, as able  Description: Interventions:  - Observe, teach, and assist patient with ADLS  - Monitor and promote a balance of rest/activity, with adequate nutrition and elimination   Outcome: Progressing     Problem: Risk for Self Injury/Neglect  Goal: Treatment Goal: Remain safe during length of stay, learn and adopt new coping skills, and be free of self-injurious ideation, impulses and acts at the time of discharge  Outcome: Progressing  Goal: Verbalize thoughts and feelings  Description: Interventions:  - Assess and re-assess patient's lethality and potential for self-injury  - Engage patient in 1:1 interactions, daily, for a minimum of 15 minutes  - Encourage patient to express feelings, fears, frustrations, hopes  - Establish rapport/trust with patient   Outcome: Progressing  Goal: Refrain from harming self  Description: Interventions:  - Monitor patient closely, per order  - Develop a trusting relationship  - Supervise medication ingestion, monitor effects and side effects   Outcome: Progressing  Goal: Attend and participate in unit activities, including therapeutic, recreational, and educational groups  Description: Interventions:  - Provide therapeutic and educational activities daily, encourage attendance and participation, and document same in the medical record  - Obtain collateral information, encourage visitation and family involvement in care   Outcome: Progressing  Goal: Recognize maladaptive responses and adopt new coping mechanisms  Outcome: Progressing  Goal: Complete daily ADLs, including personal hygiene independently, as able  Description: Interventions:  - Observe, teach, and assist patient with ADLS  - Monitor and promote a balance of rest/activity, with adequate nutrition and elimination  Outcome: Progressing     Problem: Depression  Goal: Treatment Goal: Demonstrate  behavioral control of depressive symptoms, verbalize feelings of improved mood/affect, and adopt new coping skills prior to discharge  Outcome: Progressing  Goal: Verbalize thoughts and feelings  Description: Interventions:  - Assess and re-assess patient's level of risk   - Engage patient in 1:1 interactions, daily, for a minimum of 15 minutes   - Encourage patient to express feelings, fears, frustrations, hopes   Outcome: Progressing  Goal: Refrain from harming self  Description: Interventions:  - Monitor patient closely, per order   - Supervise medication ingestion, monitor effects and side effects   Outcome: Progressing  Goal: Refrain from isolation  Description: Interventions:  - Develop a trusting relationship   - Encourage socialization   Outcome: Progressing  Goal: Refrain from self-neglect  Outcome: Progressing  Goal: Attend and participate in unit activities, including therapeutic, recreational, and educational groups  Description: Interventions:  - Provide therapeutic and educational activities daily, encourage attendance and participation, and document same in the medical record   Outcome: Progressing  Goal: Complete daily ADLs, including personal hygiene independently, as able  Description: Interventions:  - Observe, teach, and assist patient with ADLS  -  Monitor and promote a balance of rest/activity, with adequate nutrition and elimination   Outcome: Progressing     Problem: Anxiety  Goal: Anxiety is at manageable level  Description: Interventions:  - Assess and monitor patient's anxiety level.   - Monitor for signs and symptoms (heart palpitations, chest pain, shortness of breath, headaches, nausea, feeling jumpy, restlessness, irritable, apprehensive).   - Collaborate with interdisciplinary team and initiate plan and interventions as ordered.  - [place] patient to unit/surroundings  - Explain treatment plan  - Encourage participation in care  - Encourage verbalization of concerns/fears  - Identify  coping mechanisms  - Assist in developing anxiety-reducing skills  - Administer/offer alternative therapies  - Limit or eliminate stimulants  Outcome: Progressing     Problem: Risk for Violence/Aggression Toward Others  Goal: Treatment Goal: Refrain from acts of violence/aggression during length of stay, and demonstrate improved impulse control at the time of discharge  Outcome: Progressing  Goal: Verbalize thoughts and feelings  Description: Interventions:  - Assess and re-assess patient's level of risk, every waking shift  - Engage patient in 1:1 interactions, daily, for a minimum of 15 minutes   - Allow patient to express feelings and frustrations in a safe and non-threatening manner   - Establish rapport/trust with patient   Outcome: Progressing  Goal: Refrain from harming others  Outcome: Progressing  Goal: Refrain from destructive acts on the environment or property  Outcome: Progressing  Goal: Control angry outbursts  Description: Interventions:  - Monitor patient closely, per order  - Ensure early verbal de-escalation  - Monitor prn medication needs  - Set reasonable/therapeutic limits, outline behavioral expectations, and consequences   - Provide a non-threatening milieu, utilizing the least restrictive interventions   Outcome: Progressing  Goal: Attend and participate in unit activities, including therapeutic, recreational, and educational groups  Description: Interventions:  - Provide therapeutic and educational activities daily, encourage attendance and participation, and document same in the medical record   Outcome: Progressing  Goal: Identify appropriate positive anger management techniques  Description: Interventions:  - Offer anger management and coping skills groups   - Staff will provide positive feedback for appropriate anger control  Outcome: Progressing     Problem: Alteration in Orientation  Goal: Treatment Goal: Demonstrate a reduction of confusion and improved orientation to person, place,  time and/or situation upon discharge, according to optimum baseline/ability  Outcome: Progressing  Goal: Interact with staff daily  Description: Interventions:  - Assess and re-assess patient's level of orientation  - Engage patient in 1 on 1 interactions, daily, for a minimum of 15 minutes   - Establish rapport/trust with patient   Outcome: Progressing  Goal: Express concerns related to confused thinking related to:  Description: Interventions:  - Encourage patient to express feelings, fears, frustrations, hopes  - Assign consistent caregivers   - [place]/re-orient patient as needed  - Allow comfort items, as appropriate  - Provide visual cues, signs, etc.   Outcome: Progressing  Goal: Allow medical examinations, as recommended  Description: Interventions:  - Provide physical/neurological exams and/or referrals, per provider   Outcome: Progressing  Goal: Cooperate with recommended testing/procedures  Description: Interventions:  - Determine need for ancillary testing  - Observe for mental status changes  - Implement falls/precaution protocol   Outcome: Progressing  Goal: Attend and participate in unit activities, including therapeutic, recreational, and educational groups  Description: Interventions:  - Provide therapeutic and educational activities daily, encourage attendance and participation, and document same in the medical record   - Provide appropriate opportunities for reminiscence   - Provide a consistent daily routine   - Encourage family contact/visitation   Outcome: Progressing  Goal: Complete daily ADLs, including personal hygiene independently, as able  Description: Interventions:  - Observe, teach, and assist patient with ADLS  Outcome: Progressing     Problem: DISCHARGE PLANNING - CARE MANAGEMENT  Goal: Discharge to post-acute care or home with appropriate resources  Description: INTERVENTIONS:  - Conduct assessment to determine patient/family and health care team treatment goals, and need for  post-acute services based on payer coverage, community resources, and patient preferences, and barriers to discharge  - Address psychosocial, clinical, and financial barriers to discharge as identified in assessment in conjunction with the patient/family and health care team  - Arrange appropriate level of post-acute services according to patient’s   needs and preference and payer coverage in collaboration with the physician and health care team  - Communicate with and update the patient/family, physician, and health care team regarding progress on the discharge plan  - Arrange appropriate transportation to post-acute venues  Outcome: Progressing     Problem: Nutrition/Hydration-ADULT  Goal: Nutrient/Hydration intake appropriate for improving, restoring or maintaining nutritional needs  Description: Monitor and assess patient's nutrition/hydration status for malnutrition. Collaborate with interdisciplinary team and initiate plan and interventions as ordered.  Monitor patient's weight and dietary intake as ordered or per policy. Utilize nutrition screening tool and intervene as necessary. Determine patient's food preferences and provide high-protein, high-caloric foods as appropriate.     INTERVENTIONS:  - Monitor oral intake, urinary output, labs, and treatment plans  - Assess nutrition and hydration status and recommend course of action  - Evaluate amount of meals eaten  - Assist patient with eating if necessary   - Allow adequate time for meals  - Recommend/ encourage appropriate diets, oral nutritional supplements, and vitamin/mineral supplements  - Order, calculate, and assess calorie counts as needed  - Recommend, monitor, and adjust tube feedings and TPN/PPN based on assessed needs  - Assess need for intravenous fluids  - Provide specific nutrition/hydration education as appropriate  - Include patient/family/caregiver in decisions related to nutrition  Outcome: Progressing

## 2025-03-12 NOTE — PROGRESS NOTES
03/12/25    Team Meeting   Meeting Type Daily Rounds   Team Members Present   Team Members Present Physician;Occupational Therapist;Nurse;   Physician Team Member Eagle Fierro MD   Nursing Team Member Michelle WATERS   Social Work Team Member Horacio CALVIN   OT Team Member Gino HAND   Patient/Family Present   Patient Present No   Patient's Family Present No   201, mild anx, denies dep.[id number].[id number], PRN atarax effective, slept, med complaint, pleasant, visible, no d/c date med monitor adjust

## 2025-03-13 PROCEDURE — 99232 SBSQ HOSP IP/OBS MODERATE 35: CPT | Performed by: PSYCHIATRY & NEUROLOGY

## 2025-03-13 PROCEDURE — 99221 1ST HOSP IP/OBS SF/LOW 40: CPT | Performed by: PODIATRIST

## 2025-03-13 PROCEDURE — 11721 DEBRIDE NAIL 6 OR MORE: CPT | Performed by: PODIATRIST

## 2025-03-13 PROCEDURE — 11056 PARNG/CUTG B9 HYPRKR LES 2-4: CPT | Performed by: PODIATRIST

## 2025-03-13 RX ADMIN — Medication 1 APPLICATION: at 09:04

## 2025-03-13 RX ADMIN — DEXTRAN 70, GLYCERIN, HYPROMELLOSE 1 DROP: 1; 2; 3 SOLUTION/ DROPS OPHTHALMIC at 21:08

## 2025-03-13 RX ADMIN — ARIPIPRAZOLE 10 MG: 10 TABLET ORAL at 08:54

## 2025-03-13 RX ADMIN — ACETAMINOPHEN 975 MG: 325 TABLET ORAL at 23:19

## 2025-03-13 RX ADMIN — CYANOCOBALAMIN TAB 500 MCG 500 MCG: 500 TAB at 08:54

## 2025-03-13 RX ADMIN — LAMOTRIGINE 50 MG: 25 TABLET ORAL at 08:54

## 2025-03-13 RX ADMIN — MONTELUKAST 10 MG: 10 TABLET, FILM COATED ORAL at 08:54

## 2025-03-13 RX ADMIN — FOLIC ACID 1 MG: 1 TABLET ORAL at 08:54

## 2025-03-13 RX ADMIN — Medication 3 MG: at 21:08

## 2025-03-13 RX ADMIN — THIAMINE HCL TAB 100 MG 100 MG: 100 TAB at 08:54

## 2025-03-13 RX ADMIN — PANTOPRAZOLE SODIUM 40 MG: 40 TABLET, DELAYED RELEASE ORAL at 05:36

## 2025-03-13 RX ADMIN — Medication 1 TABLET: at 08:54

## 2025-03-13 RX ADMIN — CHOLECALCIFEROL TAB 25 MCG (1000 UNIT) 2000 UNITS: 25 TAB at 08:54

## 2025-03-13 RX ADMIN — LAMOTRIGINE 100 MG: 100 TABLET ORAL at 21:08

## 2025-03-13 NOTE — CONSULTS
[facility] Podiatry - Consultation    Patient Information:   Brisa Nick 59 y.o. female MRN: [id number]  Unit/Bed#: [facility] 205-01 Encounter: [id number]  PCP: Varun Guy MD  Date of Admission:  3/3/2025  Date of Consultation: 03/13/25  Requesting Physician: Khoi Beltran MD      ASSESSMENT:    Brisa Nick is a 59 y.o. female with:    Painful mycotic nails on both feet x 6  Painful calluses on the sides of both big toes  Dry flaky skin present on the bottom of both heels    PLAN:    Debride mycotic nails and thin the nail plates x 6 with the use of a nail nipper manually and an electric Dremel bur was used to reduce the thickness of the nail beds and smoothed the distal aspect of the nails.  Debride Tyloma to dermal layer x 2 with the use of a 312 blade and sharp dissection.    Recommend that the patient uses the in-house lotion daily on both of her feet or if she has lotion in her room using it daily on both feet.  Recommend a thicker style of lotion once she gets home.  Rest of care per primary team.      Weight Bearing Status: FWB    SUBJECTIVE    History of Present Illness:    Brisa Nick is a 59 y.o. female with past medical history of fibromyalgia, degenerative disc disease, Raynaud's phenomena who presents with with chief complaint of painful thick nails and painful calluses on both feet.  She has a third complaint of dry skin present on both heels..     Review of Systems:    Constitutional: Negative.    HENT: Negative.    Eyes: Negative.    Respiratory: Negative.    Cardiovascular: Negative.    Gastrointestinal: Negative.    Musculoskeletal: Negative  Skin: Nails are brittle elongated yellow-white discoloration, hypertrophic tissue at the first IPJ's, dry flaky skin present on the backs of both heels.  Neurological: Negative  Psych: Negative.     Past Medical and Surgical History:     Past Medical History:   Diagnosis Date    Alcohol abuse     Bilateral carpal tunnel syndrome 02/14/2020     Bipolar disorder ([facility])     Chronic pain syndrome     Degenerative disc disease, cervical 11/14/2019    Fibromyalgia 02/09/2012    Last Assessment & Plan:  Formatting of this note might be different from the original. 1.  As we discussed, given that your fibromyalgia is so symptomatic, treatment with a medication is reasonable in addition to the usual modalities of regular exercise, stretching and sleep hygiene.  The 3 medications approved for fibromyalgia or duloxetine, Lyrica and Savella.  My choice would be duloxetine.  Yo    Foraminal stenosis of cervical region 02/14/2020    Hip pain     History of vertebral fracture 02/13/2017    Lumbago     Migraine headache 05/02/2015    Last Assessment & Plan:  Formatting of this note might be different from the original. 1.  To reduce frequency of migraines, please follow a regular daily schedule going to bed and awakening at the same time.  Do not skip meals.  Maintain good hydration, preferably with water, throughout the day.  Avoid those foods/beverages/situations which you know can bring on migraines. 2.  Take turmeric (appr    Mild intermittent asthma without complication 02/09/2012    Formatting of this note might be different from the original. intermittent  Last Assessment & Plan:  Formatting of this note might be different from the original. 1.  Your lung exam is entirely clear during this visit.  You informs me that you had used your nebulizer before coming to the office. 2.  Continue using the nebulizer with the albuterol solution up to 4 times a day as needed. 3.  It goes    Mood disorder ([facility]) 10/05/2015    Nicotine addiction     Posttraumatic stress disorder 05/05/2014    Raynaud's phenomenon 09/18/2014    Vitamin D deficiency 04/18/2014    Last Assessment & Plan:  Formatting of this note might be different from the original. 1.  It is not clear why you are having a low level of vitamin D deficiency despite taking a supplement.  One possibility is an  intestinal disorder such as celiac disease. 2.  Continue with the higher dose of vitamin D 5000 units daily for at least one month.  One month from now, stop the biotin for at least 1 we       Past Surgical History:   Procedure Laterality Date    COLONOSCOPY      Age 48 for screening, normal per patient. Details unknown.    COLONOSCOPY  02/14/2025    Dr Crawford    COLONOSCOPY W/ BIOPSIES  06/2022    dr crawford; mild colitis etiology unclear.    COLONOSCOPY W/ BIOPSIES  02/2024    dr crawford; patch colitis with small ulcers; diverticulosis    EGD  11/04/2022    [facility]- JEANMARIE Crawford MD.- Esophageal Ring; normal stomach; normal duodenum; 3cm hiatal hernia.    EGD  02/14/2025    Dr Crawford       Meds/Allergies:      Medications Prior to Admission:     albuterol (PROVENTIL HFA,VENTOLIN HFA) 90 mcg/act inhaler    b complex vitamins capsule    buprenorphine (Butrans) 15 mcg/hr    cholecalciferol (VITAMIN D3) 1,000 units tablet    HYDROcodone-acetaminophen (NORCO) 5-325 mg per tablet    Magnesium Oxide -Mg Supplement 500 MG TABS    Probiotic Product (Align Dualbiotic) CHEW    Red Yeast Rice Extract 600 MG CAPS    ARIPiprazole (ABILIFY) 5 mg tablet    cloNIDine (CATAPRES) 0.1 mg tablet    cycloSPORINE (RESTASIS) 0.05 % ophthalmic emulsion    dicyclomine (BENTYL) 20 mg tablet    diphenoxylate-atropine (LOMOTIL) 2.5-0.025 mg per tablet    fluconazole (DIFLUCAN) 150 mg tablet    hydrOXYzine HCL (ATARAX) 10 mg tablet    hyoscyamine (LEVSIN/SL) 0.125 mg SL tablet    ibuprofen (MOTRIN) 600 mg tablet    lamoTRIgine (LaMICtal) 100 mg tablet    lamoTRIgine (LaMICtal) 50 MG TBDP    loperamide (IMODIUM A-D) 2 MG tablet    LORazepam (Ativan) 0.5 mg tablet    montelukast (SINGULAIR) 10 mg tablet    NALOXONE HCL NA    NON FORMULARY    omeprazole (PriLOSEC) 40 MG capsule    Potassium 99 MG TABS    Pulmicort Flexhaler 90 MCG/ACT inhaler    sodium picosulfate, magnesium oxide, citric acid (Clenpiq) oral solution    triamcinolone (KENALOG) 0.1 % cream     Upadacitinib ER (Rinvoq) 15 MG TB24    Allergies   Allergen Reactions    Gabapentin Other (See Comments)     SUICIDAL IDEATION    Suicidal ideation      Other Reaction(s): Other    Amoxicillin Hives    Banana - Food Allergy Other (See Comments)    Banana Extract Allergy Skin Test - Food Allergy Other (See Comments)    Cefadroxil Other (See Comments)     NOT SPECIFIED    Cephalexin Other (See Comments)     LEG WELTS    Other reaction(s): welt on leg    Cephalosporins Other (See Comments)     NOT SPECIFIED    CEPHALEXIN      Other Reaction(s): Other    Charentais Melon (Wolof Melon) Other (See Comments)    Latex Hives and Itching     Other Reaction(s): Hives/Urtica , Itching    Lithium Other (See Comments)     RASH    Misoprostol Other (See Comments) and Diarrhea     NOT SPECIFIED    Other Reaction(s): Diarrhea    Nuts - Food Allergy Other (See Comments)    Other Other (See Comments)     Some fruits - need to clarify with pt      Other Reaction(s): Other    All antidepressants. Other reaction(s): induce elena      Other Reaction(s): Other    Quetiapine Other (See Comments)     NOT SPECIFIED    Other reaction(s): manic episode    Sulfasalazine Other (See Comments)     Felt shakey all over, palpitation    Silver Rash     Other Reaction(s): Rash    Wound Dressing Adhesive Rash       Social History:     Marital Status: Legally     Substance Use History:   Social History     Substance and Sexual Activity   Alcohol Use Never     Social History     Tobacco Use   Smoking Status Every Day    Current packs/day: 1.00    Average packs/day: 1 pack/day for 43.0 years (43.0 ttl pk-yrs)    Types: Cigarettes   Smokeless Tobacco Never     Social History     Substance and Sexual Activity   Drug Use Yes    Types: Marijuana       Family History:    Family History   Problem Relation Age of Onset    Kidney cancer Sister     Colon polyps Sister     Colon cancer Paternal Grandmother     Colon cancer Paternal Aunt     Colon cancer  "Paternal Uncle          OBJECTIVE:    Vitals:   Blood Pressure: 123/66 (03/13/25 1619)  Pulse: 75 (03/13/25 1619)  Temperature: 98.6 °F (37 °C) (03/13/25 1619)  Temp Source: Temporal (03/13/25 1619)  Respirations: 18 (03/13/25 1619)  Height: 5' 3\" (160 cm) (03/05/25 1417)  Weight - Scale: 76.1 kg (167 lb 12.8 oz) (03/11/25 0753)  SpO2: 95 % (03/13/25 1619)    Physical Exam:     General Appearance: Alert, cooperative, no distress.  HEENT: Head normocephalic, atraumatic, without obvious abnormality.  Heart: Normal rate and rhythm.  Lungs: Non-labored breathing. No respiratory distress.  Abdomen: Without distension.  Psychiatric: AAOx3  Lower Extremity:  Vascular:   DP/PT pedal pulses on the left are present. DP/PT pedal pulses on the right are present. CRT brisk at the digits. Pedal hair is absent. negative edema noted at bilateral lower extremities. Skin temperature is within normal limits bilaterally.    Musculoskeletal:  MMT is 4/5 in all muscle compartments bilaterally. Passive ROM at the 1st MPJ and ankle joint are Normal bilaterally with the leg extended. Active ROM at the lesser digits is intact. No Pain on palpation No gross deformities noted.     Dermatological:  Nails are brittle elongated hypertrophic yellow-white discoloration with subungual debris x 6.  There is an increased thickness in the nails of approximately 2 to 3 mm.  There is hypertrophic tissue present on the plantar medial aspect of the first IPJ's bilaterally with no central IPK, no dried blood within the tissue, and no trophic changes noted.  Small amount of dry flaky tissue is present on the posterior aspect of both heels with negative signs of any fissures or dried blood present.  The area that is covered by the dry skin is the posterior plantar aspect of both heels.  Skin is of normal appearance, temperature, and turgor with no open lesions or ulcerations noted.    Neurological:  Gross sensation is intact. Protective sensation is intact. " "Patient Denies numbness and/or paresthesias.        Additional Data:     Lab Results: I have personally reviewed pertinent labs including:    Results from last 7 days   Lab Units 03/07/25  0518   WBC Thousand/uL 8.67   HEMOGLOBIN g/dL 13.7   HEMATOCRIT % 42.6   PLATELETS Thousands/uL 267   SEGS PCT % 60   LYMPHO PCT % 25   MONO PCT % 9   EOS PCT % 5     Results from last 7 days   Lab Units 03/07/25  0518   POTASSIUM mmol/L 4.0   CHLORIDE mmol/L 102   CO2 mmol/L 31   BUN mg/dL 19   CREATININE mg/dL 0.72   CALCIUM mg/dL 9.4   ALK PHOS U/L 49   ALT U/L 16   AST U/L 16           Cultures: I have personally reviewed pertinent cultures including:              Imaging: I have personally reviewed pertinent films in PACS.  EKG, Pathology, and Other Studies: I have personally reviewed pertinent reports.        ** Please Note: Portions of the record may have been created with voice recognition software. Occasional wrong word or \"sound a like\" substitutions may have occurred due to the inherent limitations of voice recognition software. Read the chart carefully and recognize, using context, where substitutions have occurred. **   "

## 2025-03-13 NOTE — NURSING NOTE
Patient was visible in the community this evening; spending time in the dining area for snack time.  She is calm and cooperative during staff interactions.  Endorses feeling tired. She denies feeling anxious and/ or depressed, denies SI, HI and hallucinations. Brisa was medication compliant at . She requrested and received PRN Tylenol for c/o generalized pain with relief obtained.  No further complaints voiced by patient.  Continuous safety rounding in progress.

## 2025-03-13 NOTE — NURSING NOTE
Patient visible in milieu with peers, medication compliant and cooperative. Patient denied all, no anxiety/depression, denies SI/HI and hallucinations. Patient is more social and attends group therapies and interacts appropriately with peers. Continued care and safety rounds in progress.

## 2025-03-13 NOTE — PLAN OF CARE
Problem: Ineffective Coping  Goal: Demonstrates healthy coping skills  Outcome: Progressing  Goal: Participates in unit activities  Description: Interventions:  - Provide therapeutic environment   - Provide required programming   - Redirect inappropriate behaviors   Outcome: Progressing  Goal: Understands least restrictive measures  Description: Interventions:  - Utilize least restrictive behavior  Outcome: Progressing  Goal: Free from restraint events  Description: - Utilize least restrictive measures   - Provide behavioral interventions   - Redirect inappropriate behaviors   Outcome: Progressing     Problem: Risk for Self Injury/Neglect  Goal: Refrain from harming self  Description: Interventions:  - Monitor patient closely, per order  - Develop a trusting relationship  - Supervise medication ingestion, monitor effects and side effects   Outcome: Progressing     Problem: Anxiety  Goal: Anxiety is at manageable level  Description: Interventions:  - Assess and monitor patient's anxiety level.   - Monitor for signs and symptoms (heart palpitations, chest pain, shortness of breath, headaches, nausea, feeling jumpy, restlessness, irritable, apprehensive).   - Collaborate with interdisciplinary team and initiate plan and interventions as ordered.  - [place] patient to unit/surroundings  - Explain treatment plan  - Encourage participation in care  - Encourage verbalization of concerns/fears  - Identify coping mechanisms  - Assist in developing anxiety-reducing skills  - Administer/offer alternative therapies  - Limit or eliminate stimulants  Outcome: Progressing     Problem: Risk for Violence/Aggression Toward Others  Goal: Verbalize thoughts and feelings  Description: Interventions:  - Assess and re-assess patient's level of risk, every waking shift  - Engage patient in 1:1 interactions, daily, for a minimum of 15 minutes   - Allow patient to express feelings and frustrations in a safe and non-threatening manner   -  Establish rapport/trust with patient   Outcome: Progressing     Problem: Alteration in Orientation  Goal: Interact with staff daily  Description: Interventions:  - Assess and re-assess patient's level of orientation  - Engage patient in 1 on 1 interactions, daily, for a minimum of 15 minutes   - Establish rapport/trust with patient   Outcome: Progressing     Problem: Nutrition/Hydration-ADULT  Goal: Nutrient/Hydration intake appropriate for improving, restoring or maintaining nutritional needs  Description: Monitor and assess patient's nutrition/hydration status for malnutrition. Collaborate with interdisciplinary team and initiate plan and interventions as ordered.  Monitor patient's weight and dietary intake as ordered or per policy. Utilize nutrition screening tool and intervene as necessary. Determine patient's food preferences and provide high-protein, high-caloric foods as appropriate.     INTERVENTIONS:  - Monitor oral intake, urinary output, labs, and treatment plans  - Assess nutrition and hydration status and recommend course of action  - Evaluate amount of meals eaten  - Assist patient with eating if necessary   - Allow adequate time for meals  - Recommend/ encourage appropriate diets, oral nutritional supplements, and vitamin/mineral supplements  - Order, calculate, and assess calorie counts as needed  - Recommend, monitor, and adjust tube feedings and TPN/PPN based on assessed needs  - Assess need for intravenous fluids  - Provide specific nutrition/hydration education as appropriate  - Include patient/family/caregiver in decisions related to nutrition  Outcome: Progressing

## 2025-03-13 NOTE — TREATMENT TEAM
03/12/25 2123   Pain Assessment   Pain Assessment Tool 0-10   Pain Score 7   Pain Location/Orientation Location: Generalized   Pain Onset/Description Onset: Ongoing;[facility]   Hospital Pain Intervention(s) Medication (See MAR)     Patient requested and received PRN Tylenol for c/o severe generalized pain.

## 2025-03-13 NOTE — PROGRESS NOTES
Progress Note - Behavioral Health     Brisa Nick 59 y.o. female MRN: [id number]   Unit/Bed#: [facility] 205-01 Encounter: [id number]    Behavior over the last 24 hours: improved.     Brisa seen today, per staff report has been visible on the unit.  The staff reports that she has been attending the groups participating with other peers.  Calm and cooperative.  Denying any depression or anxiety.  Denying suicidal ideas or homicidal ideas or any hallucination.  Remains med compliant.    The patient reported that today she is feeling much better and she is not as drowsy as yesterday.  She reports that her moods have been better.  She has much clearer thoughts.  She is not reporting any racing thoughts or depressed moods.  She says that she was able to sleep better yesterday.  Denies any auditory or visual hallucination.  She is not reporting any grandiose ideas or paranoid thoughts.  Participation in group activities has also been fairly good.         Sleep: improved  Appetite: normal  Medication side effects: Denying any current side effects.      Mental Status Evaluation:    Appearance:  age appropriate, dressed appropriately, adequate grooming   Behavior:  pleasant, cooperative, calm   Speech:  normal rate, normal volume, normal pitch   Mood:  normal   Affect:  appropriate   Thought Process:  logical, coherent, goal directed, linear   Associations: intact associations   Thought Content:  no overt delusions   Perceptual Disturbances: no auditory hallucinations, no visual hallucinations   Risk Potential: Suicidal ideation - None at present  Homicidal ideation - None at present   Sensorium:  oriented to person, place, and time/date   Memory:  recent and remote memory grossly intact   Consciousness:  alert and awake   Attention: attention span and concentration are age appropriate   Insight:  good   Judgment: good   Gait/Station: normal gait/station   Motor Activity: no abnormal movements     Vital signs in last 24  hours:    Temp:  [97.3 °F (36.3 °C)-98.2 °F (36.8 °C)] 97.3 °F (36.3 °C)  HR:  [70-90] 70  BP: ()/(56-57) 108/57  Resp:  [17-18] 17  SpO2:  [93 %-96 %] 96 %  O2 Device: None (Room air)    Laboratory results: I have personally reviewed all pertinent laboratory/tests results.  Most Recent Labs:   Lab Results   Component Value Date    WBC 8.67 03/07/2025    RBC 4.73 03/07/2025    HGB 13.7 03/07/2025    HCT 42.6 03/07/2025     03/07/2025    RDW 12.4 03/07/2025    NEUTROABS 5.25 03/07/2025    SODIUM 139 03/07/2025    K 4.0 03/07/2025     03/07/2025    CO2 31 03/07/2025    BUN 19 03/07/2025    CREATININE 0.72 03/07/2025    GLUC 101 03/07/2025    GLUF 101 (H) 03/07/2025    CALCIUM 9.4 03/07/2025    AST 16 03/07/2025    ALT 16 03/07/2025    ALKPHOS 49 03/07/2025    TP 6.2 (L) 03/07/2025    ALB 3.9 03/07/2025    TBILI 0.37 03/07/2025    CHOLESTEROL 146 03/04/2025    HDL 37 (L) 03/04/2025    TRIG 128 03/04/2025    LDLCALC 83 03/04/2025    NONHDLC 109 03/04/2025    [id number] 0.326 (L) 03/02/2025    FREET4 0.97 03/02/2025    HGBA1C 5.8 (H) 01/21/2025     01/21/2025           Assessment & Plan   Principal Problem:    Bipolar affective disorder, depressed, severe, with psychotic behavior (HCC)  Active Problems:    Degenerative disc disease, cervical    Migraine headache    Fibromyalgia    Posttraumatic stress disorder    Crohn's colitis (HCC)    GERD (gastroesophageal reflux disease)    Moderate protein-calorie malnutrition (HCC)    Recommended Treatment:     Planned medication and treatment changes:      All current active medications have been reviewed  Encourage group therapy, milieu therapy and occupational therapy  Behavioral Health checks for safety monitoring  The patient will be observed at current dosage of medication.  Her drowsiness has certainly decreased and moods have improved.  Current Facility-Administered Medications   Medication Dose Route Frequency Provider Last Rate     acetaminophen  650 mg Oral Q4H PRN Mary Fernandez MD      acetaminophen  650 mg Oral Q4H PRN Mary Fernandez MD      acetaminophen  975 mg Oral Q6H PRN Mary Fernandez MD      albuterol  2 puff Inhalation Q4H PRN Brad Dailey MD      aluminum-magnesium hydroxide-simethicone  30 mL Oral Q4H PRN Mary Fernandez MD      ammonium lactate   Topical BID DIANE OsorioC      ARIPiprazole  10 mg Oral Daily Cody Guillermo MD      Artificial Tears  1 drop Both Eyes Q12H [facility] Brad Dailey MD      buprenorphine  1 patch Transdermal Q7 Days Brad Dailey MD      calcium carbonate  500 mg Oral TID PRN DIANE OsorioC      cholecalciferol  2,000 Units Oral Daily Mary Fernandez MD      cyanocobalamin  500 mcg Oral Daily TYESHA Osorio-C      folic acid  1 mg Oral Daily Brad Dailey MD      haloperidol lactate  5 mg Intramuscular Q4H PRN Max 4/day Mary Fernandez MD      hydrOXYzine HCL  25 mg Oral Q6H PRN Max 4/day Mary Fernandez MD      hydrOXYzine HCL  50 mg Oral Q6H PRN Max 4/day Mary Fernandez MD      lamoTRIgine  100 mg Oral HS Cody Guillermo MD      lamoTRIgine  50 mg Oral Daily Cody Guillermo MD      LORazepam  0.5 mg Oral Q8H PRN Khoi Beltran MD      melatonin  3 mg Oral HS Mary Fernandez MD      montelukast  10 mg Oral Daily Brad Dailey MD      multivitamin-minerals  1 tablet Oral Daily Brad Dailey MD      nicotine  1 patch Transdermal Daily Brda Dailey MD      nicotine polacrilex  4 mg Oral Q2H PRN Mary Fernandez MD      OLANZapine  5 mg Oral TID PRN Khoi Beltran MD      pantoprazole  40 mg Oral Early Morning Brad Dailey MD      propranolol  5 mg Oral Q8H PRN Mary Fernandez MD      risperiDONE  0.5 mg Oral Q4H PRN Max 6/day Khoi Beltran MD      risperiDONE  1 mg Oral Q4H PRN Max 3/day Khoi Beltran MD      thiamine  100 mg Oral Daily Brad Dailey MD      traZODone  50 mg Oral HS PRN Khoi Beltran MD         Risks / Benefits of Treatment:    Risks, benefits, and  possible side effects of medications explained to patient and patient verbalizes understanding and agreement for treatment.    Counseling / Coordination of Care:    Total floor / unit time spent today 15 minutes. Greater than 50% of total time was spent with the patient and / or family counseling and / or coordination of care. A description of counseling / coordination of care:  Patient's progress discussed with staff in treatment team meeting.  Reviewed the note regarding discharge for next Tuesday.  The daughter is willing to pick her up as per note and help her.    Cody Guillermo MD 03/13/25

## 2025-03-13 NOTE — PROGRESS NOTES
03/13/25    Team Meeting   Meeting Type Daily Rounds   Team Members Present   Team Members Present Physician;Nurse;;Occupational Therapist   Physician Team Member Eagle Fierro MD   Nursing Team Member Michelle WATERS   Social Work Team Member Horacio CALVIN   OT Team Member Gino HAND   Patient/Family Present   Patient Present No   Patient's Family Present No   201, slept, bright, hygiene improved, visible, med complaint d/c Mon home with psych and tt after med monitoring and continued stabilization

## 2025-03-13 NOTE — NURSING NOTE
Patient slept the majority of the overnight hours, no acute behaviors observed.   No complaints voiced.  Continuous safety rounding in progress.

## 2025-03-13 NOTE — PROGRESS NOTES
"Progress Note - Brisa Nick 59 y.o. female MRN: [id number]    Unit/Bed#: [facility] 205-01 Encounter: [id number]        Subjective:   Patient seen and examined at bedside after reviewing the chart and discussing the case with the caring staff.      Patient examined at bedside.  Patient without any acute symptoms.     Physical Exam   Vitals: Blood pressure 108/57, pulse 70, temperature (!) 97.3 °F (36.3 °C), temperature source Temporal, resp. rate 17, height 5' 3\" (1.6 m), weight 76.1 kg (167 lb 12.8 oz), SpO2 96%.,Body mass index is 29.72 kg/m².  Constitutional: Patient in no acute distress.  HEENT: PERR, EOMI, MMM.  Cardiovascular: Normal rate and regular rhythm.    Pulmonary/Chest: Effort normal and breath sounds normal.   Abdomen: Soft, + BS, NT.    Assessment/Plan:  Brisa Nick is a(n) 59 y.o. year old female with bipolar affective disorder.     Asthma.  Patient is on Singulair 10 mg daily.  Albuterol inhaler as needed.  Chronic pain syndrome.  Involving lower back and bilateral hips.  Patient follows with pain management on outpt basis and on Butrans patch weekly.  Tylenol as needed.  Nicotine dependence.  NRT.  Alcohol abuse.  Patient started thiamine, folic acid and multivitamin.  Insomnia.  Patient is on melatonin 3 mg at bedtime.  Vitamin D deficiency.  Continue vitamin D supplement.  Dry eyes.  Patient is on artificial tears twice daily.  Vitamin B12 deficiency.  Pt started on vitamin B12 500 mcg daily.   GERD. Patient started on Protonix 40 mg daily.  Dry skin.  Apply ammonium lactate twice daily.  Overgrown toenails.  Podiatry consulted.  Constipation.  Now with diarrhea.  Hold Senokot-S and MiraLAX daily.  Given Lomotil x1 on 3/10.  Malnutrition.  Nutrition following.  Adult Malnutrition type: Chronic illness.  Adult Degree of Malnutrition: Malnutrition of moderate degree.  Malnutrition Characteristics: Inadequate energy, Weight loss.    The patient was discussed with Dr. Dailey and he is in agreement " with the above note.

## 2025-03-13 NOTE — SOCIAL WORK
RANDAL spoke to pt daughter Meeta [phone]. Provided pt progress update. Meeta states that she notices a positive change in pt from phone conversation. She shared that pt expressed concern with meds making her tired. She shared meds have been an on-going struggle bc meds that have a positive affect appear to always make pt tired. Reviewed d/c planned for Tues. Meeta in agreement and stated that she can p/u pt to assist in the transition home. RANDAL and Meeta to touch base Monday.

## 2025-03-13 NOTE — PLAN OF CARE
Problem: Alteration in Thoughts and Perception  Goal: Agree to be compliant with medication regime, as prescribed and report medication side effects  Description: Interventions:  - Offer appropriate PRN medication and supervise ingestion; conduct AIMS, as needed   Outcome: Progressing     Problem: Ineffective Coping  Goal: Participates in unit activities  Description: Interventions:  - Provide therapeutic environment   - Provide required programming   - Redirect inappropriate behaviors   Outcome: Progressing     Problem: Risk for Self Injury/Neglect  Goal: Refrain from harming self  Description: Interventions:  - Monitor patient closely, per order  - Develop a trusting relationship  - Supervise medication ingestion, monitor effects and side effects   Outcome: Progressing     Problem: Anxiety  Goal: Anxiety is at manageable level  Description: Interventions:  - Assess and monitor patient's anxiety level.   - Monitor for signs and symptoms (heart palpitations, chest pain, shortness of breath, headaches, nausea, feeling jumpy, restlessness, irritable, apprehensive).   - Collaborate with interdisciplinary team and initiate plan and interventions as ordered.  - Jay patient to unit/surroundings  - Explain treatment plan  - Encourage participation in care  - Encourage verbalization of concerns/fears  - Identify coping mechanisms  - Assist in developing anxiety-reducing skills  - Administer/offer alternative therapies  - Limit or eliminate stimulants  Outcome: Progressing     Problem: Risk for Violence/Aggression Toward Others  Goal: Refrain from harming others  Outcome: Progressing

## 2025-03-14 PROCEDURE — 99232 SBSQ HOSP IP/OBS MODERATE 35: CPT | Performed by: PSYCHIATRY & NEUROLOGY

## 2025-03-14 RX ORDER — CHLORHEXIDINE GLUCONATE ORAL RINSE 1.2 MG/ML
15 SOLUTION DENTAL EVERY 12 HOURS SCHEDULED
Status: DISCONTINUED | OUTPATIENT
Start: 2025-03-14 | End: 2025-03-18 | Stop reason: HOSPADM

## 2025-03-14 RX ORDER — DIPHENHYDRAMINE HYDROCHLORIDE AND LIDOCAINE HYDROCHLORIDE AND ALUMINUM HYDROXIDE AND MAGNESIUM HYDRO
10 KIT EVERY 4 HOURS PRN
Status: DISCONTINUED | OUTPATIENT
Start: 2025-03-14 | End: 2025-03-18 | Stop reason: HOSPADM

## 2025-03-14 RX ORDER — SACCHAROMYCES BOULARDII 250 MG
250 CAPSULE ORAL EVERY 8 HOURS
Status: DISCONTINUED | OUTPATIENT
Start: 2025-03-14 | End: 2025-03-18 | Stop reason: HOSPADM

## 2025-03-14 RX ORDER — CLINDAMYCIN HYDROCHLORIDE 150 MG/1
300 CAPSULE ORAL EVERY 8 HOURS SCHEDULED
Status: DISCONTINUED | OUTPATIENT
Start: 2025-03-14 | End: 2025-03-18 | Stop reason: HOSPADM

## 2025-03-14 RX ADMIN — CHLORHEXIDINE GLUCONATE 15 ML: 1.2 SOLUTION ORAL at 21:26

## 2025-03-14 RX ADMIN — Medication 250 MG: at 21:27

## 2025-03-14 RX ADMIN — CHOLECALCIFEROL TAB 25 MCG (1000 UNIT) 2000 UNITS: 25 TAB at 08:13

## 2025-03-14 RX ADMIN — FOLIC ACID 1 MG: 1 TABLET ORAL at 08:13

## 2025-03-14 RX ADMIN — LAMOTRIGINE 50 MG: 25 TABLET ORAL at 08:13

## 2025-03-14 RX ADMIN — ACETAMINOPHEN 975 MG: 325 TABLET ORAL at 21:26

## 2025-03-14 RX ADMIN — CYANOCOBALAMIN TAB 500 MCG 500 MCG: 500 TAB at 08:13

## 2025-03-14 RX ADMIN — PANTOPRAZOLE SODIUM 40 MG: 40 TABLET, DELAYED RELEASE ORAL at 05:49

## 2025-03-14 RX ADMIN — CLINDAMYCIN HYDROCHLORIDE 300 MG: 150 CAPSULE ORAL at 21:27

## 2025-03-14 RX ADMIN — Medication 250 MG: at 14:21

## 2025-03-14 RX ADMIN — DEXTRAN 70, GLYCERIN, HYPROMELLOSE 1 DROP: 1; 2; 3 SOLUTION/ DROPS OPHTHALMIC at 21:26

## 2025-03-14 RX ADMIN — Medication 1 TABLET: at 08:13

## 2025-03-14 RX ADMIN — CLINDAMYCIN HYDROCHLORIDE 300 MG: 150 CAPSULE ORAL at 14:21

## 2025-03-14 RX ADMIN — MONTELUKAST 10 MG: 10 TABLET, FILM COATED ORAL at 08:13

## 2025-03-14 RX ADMIN — Medication 3 MG: at 21:27

## 2025-03-14 RX ADMIN — ARIPIPRAZOLE 10 MG: 10 TABLET ORAL at 08:13

## 2025-03-14 RX ADMIN — LAMOTRIGINE 100 MG: 100 TABLET ORAL at 21:27

## 2025-03-14 RX ADMIN — THIAMINE HCL TAB 100 MG 100 MG: 100 TAB at 08:13

## 2025-03-14 NOTE — PLAN OF CARE
Problem: Ineffective Coping  Goal: Participates in unit activities  Description: Interventions:  - Provide therapeutic environment   - Provide required programming   - Redirect inappropriate behaviors   Outcome: Progressing     Problem: Risk for Violence/Aggression Toward Others  Goal: Attend and participate in unit activities, including therapeutic, recreational, and educational groups  Description: Interventions:  - Provide therapeutic and educational activities daily, encourage attendance and participation, and document same in the medical record   Outcome: Progressing

## 2025-03-14 NOTE — PLAN OF CARE
Problem: Alteration in Thoughts and Perception  Goal: Refrain from acting on delusional thinking/internal stimuli  Description: Interventions:  - Monitor patient closely, per order   - Utilize least restrictive measures   - Set reasonable limits, give positive feedback for acceptable   - Administer medications as ordered and monitor of potential side effects  Outcome: Progressing  Goal: Agree to be compliant with medication regime, as prescribed and report medication side effects  Description: Interventions:  - Offer appropriate PRN medication and supervise ingestion; conduct AIMS, as needed   Outcome: Progressing     Problem: Ineffective Coping  Goal: Participates in unit activities  Description: Interventions:  - Provide therapeutic environment   - Provide required programming   - Redirect inappropriate behaviors   Outcome: Progressing  Goal: Patient/Family participate in treatment and DC plans  Description: Interventions:  - Provide therapeutic environment  Outcome: Progressing  Goal: Patient/Family verbalizes awareness of resources  Outcome: Progressing  Goal: Understands least restrictive measures  Description: Interventions:  - Utilize least restrictive behavior  Outcome: Progressing  Goal: Free from restraint events  Description: - Utilize least restrictive measures   - Provide behavioral interventions   - Redirect inappropriate behaviors   Outcome: Progressing     Problem: Risk for Self Injury/Neglect  Goal: Treatment Goal: Remain safe during length of stay, learn and adopt new coping skills, and be free of self-injurious ideation, impulses and acts at the time of discharge  Outcome: Progressing  Goal: Refrain from harming self  Description: Interventions:  - Monitor patient closely, per order  - Develop a trusting relationship  - Supervise medication ingestion, monitor effects and side effects   Outcome: Progressing  Goal: Recognize maladaptive responses and adopt new coping mechanisms  Outcome:  Progressing     Problem: Depression  Goal: Treatment Goal: Demonstrate behavioral control of depressive symptoms, verbalize feelings of improved mood/affect, and adopt new coping skills prior to discharge  Outcome: Progressing  Goal: Refrain from isolation  Description: Interventions:  - Develop a trusting relationship   - Encourage socialization   Outcome: Progressing  Goal: Refrain from self-neglect  Outcome: Progressing  Goal: Complete daily ADLs, including personal hygiene independently, as able  Description: Interventions:  - Observe, teach, and assist patient with ADLS  -  Monitor and promote a balance of rest/activity, with adequate nutrition and elimination   Outcome: Progressing     Problem: Anxiety  Goal: Anxiety is at manageable level  Description: Interventions:  - Assess and monitor patient's anxiety level.   - Monitor for signs and symptoms (heart palpitations, chest pain, shortness of breath, headaches, nausea, feeling jumpy, restlessness, irritable, apprehensive).   - Collaborate with interdisciplinary team and initiate plan and interventions as ordered.  - [place] patient to unit/surroundings  - Explain treatment plan  - Encourage participation in care  - Encourage verbalization of concerns/fears  - Identify coping mechanisms  - Assist in developing anxiety-reducing skills  - Administer/offer alternative therapies  - Limit or eliminate stimulants  Outcome: Progressing     Problem: Risk for Violence/Aggression Toward Others  Goal: Treatment Goal: Refrain from acts of violence/aggression during length of stay, and demonstrate improved impulse control at the time of discharge  Outcome: Progressing  Goal: Refrain from harming others  Outcome: Progressing  Goal: Refrain from destructive acts on the environment or property  Outcome: Progressing  Goal: Control angry outbursts  Description: Interventions:  - Monitor patient closely, per order  - Ensure early verbal de-escalation  - Monitor prn medication  needs  - Set reasonable/therapeutic limits, outline behavioral expectations, and consequences   - Provide a non-threatening milieu, utilizing the least restrictive interventions   Outcome: Progressing     Problem: Alteration in Orientation  Goal: Interact with staff daily  Description: Interventions:  - Assess and re-assess patient's level of orientation  - Engage patient in 1 on 1 interactions, daily, for a minimum of 15 minutes   - Establish rapport/trust with patient   Outcome: Progressing     Problem: Nutrition/Hydration-ADULT  Goal: Nutrient/Hydration intake appropriate for improving, restoring or maintaining nutritional needs  Description: Monitor and assess patient's nutrition/hydration status for malnutrition. Collaborate with interdisciplinary team and initiate plan and interventions as ordered.  Monitor patient's weight and dietary intake as ordered or per policy. Utilize nutrition screening tool and intervene as necessary. Determine patient's food preferences and provide high-protein, high-caloric foods as appropriate.     INTERVENTIONS:  - Monitor oral intake, urinary output, labs, and treatment plans  - Assess nutrition and hydration status and recommend course of action  - Evaluate amount of meals eaten  - Assist patient with eating if necessary   - Allow adequate time for meals  - Recommend/ encourage appropriate diets, oral nutritional supplements, and vitamin/mineral supplements  - Order, calculate, and assess calorie counts as needed  - Recommend, monitor, and adjust tube feedings and TPN/PPN based on assessed needs  - Assess need for intravenous fluids  - Provide specific nutrition/hydration education as appropriate  - Include patient/family/caregiver in decisions related to nutrition  Outcome: Progressing

## 2025-03-14 NOTE — TREATMENT TEAM
03/13/25 3265   Pain Assessment   Pain Assessment Tool 0-10   Pain Score 10 - Worst Possible Pain   Pain Location/Orientation Orientation: Left;Location: Face  (oral, tooth)   Pain Onset/Description Descriptor: Aching;Frequency: Intermittent   Hospital Pain Intervention(s) Medication (See MAR)     Patient requested and received PRN tylenol 975 mg for c/o severe left lower tooth pain.

## 2025-03-14 NOTE — PROGRESS NOTES
"Progress Note - Brisa Nick 59 y.o. female MRN: [id number]    Unit/Bed#: -01 Encounter: [id number]        Subjective:   Patient seen and examined at bedside after reviewing the chart and discussing the case with the caring staff.      Patient examined at bedside.  Patient complaining of severe dental pain to left bottom molar which started last night.  She states she was recently on antibiotics for this 2 months ago which temporarily alleviated symptoms.  No fevers, facial swelling, or trouble swallowing.    Physical Exam   Vitals: Blood pressure 129/60, pulse 84, temperature (!) 97.4 °F (36.3 °C), temperature source Temporal, resp. rate 17, height 5' 3\" (1.6 m), weight 76.1 kg (167 lb 12.8 oz), SpO2 97%.,Body mass index is 29.72 kg/m².  Constitutional: Patient in no acute distress.  HEENT: PERR, EOMI, MMM, left bottom molar with surrounding erythema and edema.  No palpable abscess.  No facial swelling.   Cardiovascular: Normal rate and regular rhythm.    Pulmonary/Chest: Effort normal and breath sounds normal.   Abdomen: Soft, + BS, NT.    Assessment/Plan:  Brisa Ncik is a(n) 59 y.o. year old female with bipolar affective disorder.     Asthma.  Patient is on Singulair 10 mg daily.  Albuterol inhaler as needed.  Chronic pain syndrome.  Involving lower back and bilateral hips.  Patient follows with pain management on outpt basis and on Butrans patch weekly.  Tylenol as needed.  Nicotine dependence.  NRT.  Alcohol abuse.  Patient started thiamine, folic acid and multivitamin.  Insomnia.  Patient is on melatonin 3 mg at bedtime.  Vitamin D deficiency.  Continue vitamin D supplement.  Dry eyes.  Patient is on artificial tears twice daily.  Vitamin B12 deficiency.  Pt started on vitamin B12 500 mcg daily.   GERD. Patient started on Protonix 40 mg daily.  Dry skin.  Apply ammonium lactate twice daily.  Overgrown toenails.  Podiatry consulted.  Constipation.  Now with diarrhea.  Hold Senokot-S and MiraLAX " daily.  Given Lomotil x1 on 3/10.  Malnutrition.  Nutrition following.  Adult Malnutrition type: Chronic illness.  Adult Degree of Malnutrition: Malnutrition of moderate degree.  Malnutrition Characteristics: Inadequate energy, Weight loss.  Dental infection.  Pt started on clindamycin q8h x7 days along with Florastor (thru 3/21).  Will also add chlorhexidine swish&spit q12h.  Magic mouthwash as needed.  Pt encouraged to follow-up with dentist once discharged.     The patient was discussed with Dr. Dailey and he is in agreement with the above note.

## 2025-03-14 NOTE — PROGRESS NOTES
Progress Note - Behavioral Health     Brisa Nick 59 y.o. female MRN: [id number]   Unit/Bed#: [facility] 205-01 Encounter: [id number]    Behavior over the last 24 hours: improved.     Brisa seen today, per staff report has been visible on the unit.  The patient has been spending time with the peers has been pleasant and social.  She reports that she is feeling pretty good.  Denies having anxiety or depression.  No suicidal ideas or homicidal ideas are reported.  She remains medication compliant.  She did report having toothache and was given medication.  She says that she is feeling better.     The patient was seen for follow-up.  The patient is starting to feel better.  She says that she has been sleeping well.  She reports no current symptoms.  She states that her moods are better.  She is not having any paranoid thoughts or disorganized thinking.  She says that she is not experiencing the racing thoughts.  Denies any thoughts of self-harm or harm to others.  No agitation or aggressive behaviors have been reported.  She says that she is not feeling tired with Abilify as much.  She says that she still plans to go on Vraylar because of her concerns with the weight gain.         Sleep: normal  Appetite: normal  Medication side effects: None reported at present.      Mental Status Evaluation:    Appearance:  age appropriate, casually dressed, dressed appropriately   Behavior:  pleasant, cooperative   Speech:  normal rate, normal volume, normal pitch   Mood:  euthymic   Affect:  appropriate   Thought Process:  organized, logical, coherent, goal directed   Associations: intact associations   Thought Content:  no overt delusions   Perceptual Disturbances: no auditory hallucinations, no visual hallucinations   Risk Potential: Suicidal ideation - None at present  Homicidal ideation - None at present   Sensorium:  oriented to person, place, and time/date   Memory:  recent and remote memory grossly intact   Consciousness:  alert  and awake   Attention: attention span and concentration are age appropriate   Insight:  fair   Judgment: good   Gait/Station: normal gait/station   Motor Activity: no abnormal movements     Vital signs in last 24 hours:    Temp:  [97.4 °F (36.3 °C)-98.6 °F (37 °C)] 97.4 °F (36.3 °C)  HR:  [75-91] 84  BP: (121-129)/(59-66) 129/60  Resp:  [17-18] 17  SpO2:  [93 %-97 %] 97 %  O2 Device: None (Room air)    Laboratory results: I have personally reviewed all pertinent laboratory/tests results.        Assessment & Plan   Principal Problem:    Bipolar affective disorder, depressed, severe, with psychotic behavior (HCC)  Active Problems:    Degenerative disc disease, cervical    Migraine headache    Fibromyalgia    Posttraumatic stress disorder    Crohn's colitis (HCC)    GERD (gastroesophageal reflux disease)    Moderate protein-calorie malnutrition (HCC)    Recommended Treatment:     Planned medication and treatment changes:      All current active medications have been reviewed  Behavioral Health checks for safety monitoring  Continue treatment with group therapy, milieu therapy and occupational therapy  The patient will be continued with Abilify.  Discussed with the patient regarding the improvement at current dosage.  Discussed that if she decides to consider Vraylar it can be done in outpatient services but it should be a gradual change.  Discussed about the weight gain issues with atypical antipsychotics and discussed how some medications cause significant increase in weight gain and some have lesser weight gain as compared to the others.  Advised that if she notices those changes she can always discuss that after discharge with her psychiatrist.  She does have a follow-up appointment next week.  Current Facility-Administered Medications   Medication Dose Route Frequency Provider Last Rate    acetaminophen  650 mg Oral Q4H PRN Mary Fernanedz MD      acetaminophen  650 mg Oral Q4H PRN Mary Fernandez MD       acetaminophen  975 mg Oral Q6H PRN Mary Fernandez MD      albuterol  2 puff Inhalation Q4H PRN Brad Dailey MD      aluminum-magnesium hydroxide-simethicone  30 mL Oral Q4H PRN Mary Fernandez MD      ammonium lactate   Topical BID Evelin JA Rosado, PA-C      ARIPiprazole  10 mg Oral Daily Cody Guillermo MD      Artificial Tears  1 drop Both Eyes Q12H LYDIA Brad Dailey MD      buprenorphine  1 patch Transdermal Q7 Days Brad Dailey MD      calcium carbonate  500 mg Oral TID PRN Evelin L Dagjanie, PA-C      chlorhexidine  15 mL Swish & Spit Q12H LYDIA Evelin L Alonzo, PA-C      cholecalciferol  2,000 Units Oral Daily Mary Fernandez MD      cyanocobalamin  500 mcg Oral Daily Evelin L Dagnall, PA-C      diphenhydramine, lidocaine, Al/Mg hydroxide, simethicone  10 mL Swish & Spit Q4H PRN Evelin Rosado, PA-C      folic acid  1 mg Oral Daily Brad Dailey MD      haloperidol lactate  5 mg Intramuscular Q4H PRN Max 4/day Mary Fernandez MD      hydrOXYzine HCL  25 mg Oral Q6H PRN Max 4/day Mary Fernandez MD      hydrOXYzine HCL  50 mg Oral Q6H PRN Max 4/day Mary Fernandez MD      lamoTRIgine  100 mg Oral HS Cody Guillermo MD      lamoTRIgine  50 mg Oral Daily Cody Guillermo MD      LORazepam  0.5 mg Oral Q8H PRN Khoi Beltran MD      melatonin  3 mg Oral HS Mary Fernandez MD      montelukast  10 mg Oral Daily Brad Dailey MD      multivitamin-minerals  1 tablet Oral Daily Brad Dailey MD      nicotine  1 patch Transdermal Daily Qatari MD Asim      nicotine polacrilex  4 mg Oral Q2H PRN Mary Fernandez MD      OLANZapine  5 mg Oral TID PRN Khoi Beltran MD      pantoprazole  40 mg Oral Early Morning Brad Dailey MD      propranolol  5 mg Oral Q8H PRN Mary Fernandez MD      risperiDONE  0.5 mg Oral Q4H PRN Max 6/day Khoi Beltran MD      risperiDONE  1 mg Oral Q4H PRN Max 3/day Khoi Beltran MD      thiamine  100 mg Oral Daily Brad Dailey MD      traZODone  50 mg Oral HS PRN Khoi Beltran MD          Risks / Benefits of Treatment:    Risks, benefits, and possible side effects of medications explained to patient and patient verbalizes understanding and agreement for treatment.    Counseling / Coordination of Care:    Total floor / unit time spent today 15 minutes. Greater than 50% of total time was spent with the patient and / or family counseling and / or coordination of care. A description of counseling / coordination of care:  Patient's progress discussed with staff in treatment team meeting.  I have discussed the discharge plan.  I have also reviewed the note by the .  Discussed about the family's availability to pick her up.  Currently discharge is planned for Tuesday next week.  She was also advised to let us know if the toothache occurs again.    Cody Guillermo MD 03/14/25

## 2025-03-14 NOTE — NURSING NOTE
Patient appears to have slept through most of the overnight hours, waking only briefly to utilize the bathroom.  No acute behaviors observed.  No further c/o tooth pain voiced by patient after receiving PRN Tylenol last night.  Continuous safety rounding in progress.

## 2025-03-14 NOTE — NURSING NOTE
Pt has been cooperative and pleasant on the unit. She has been medication compliant and social with peers. Pt denied all psychiatric symptoms. She did endorse tooth pain and the provider was notified. She was ordered mouth rinse for it. Pt is able to make needs known and does not have any at this time. Continuous safety monitoring in place.

## 2025-03-14 NOTE — NURSING NOTE
"Patient was observed to be visible in the community this evening; spending time with peers in the dining area.  She has been pleasant and social with both staff and peers.  She states, \"I feel pretty good.\"  She denies feeling anxious and/ or depressed, denies SI, HI and hallucinations. Medication compliant at HS. No acute behaviors observed.  Continuous safety rounding in progress.   "

## 2025-03-14 NOTE — SOCIAL WORK
03/14/25    Team Meeting   Meeting Type Daily Rounds   Team Members Present   Team Members Present Physician;Nurse;;Occupational Therapist   Physician Team Member Eagle METCALF, Sri Cornelius MD   Nursing Team Member Michelle WATERS   Social Work Team Member Ezio CALVIN   OT Team Member Gino HAND   Patient/Family Present   Patient Present No   Patient's Family Present No   201, Slept Denies SI/HI/AVH anxiety/depression med complaint , visible , complained about tooth ache.D/C Tuesday with psych follow up.

## 2025-03-14 NOTE — PROGRESS NOTES
03/14/25 1220   Activity/Group Checklist   Group Admission/Discharge   Attendance Attended   Attendance Duration (min) 0-15   Interactions Interacted appropriately   Affect/Mood Appropriate   Goals Achieved Identified feelings;Identified triggers;Identified relapse prevention strategies;Discussed safety plan;Discussed coping strategies;Discussed discharge plans;Identified resources and support systems;Able to listen to others;Able to engage in interactions;Able to reflect/comment on own behavior;Able to manage/cope with feelings;Verbalized increased hopefulness;Able to self-disclose;Able to recieve feedback;Able to experience relief/decrease in symptoms     Patient agreeable to meet and complete safety/relapse prevention plan with [facility].  Patient information from forms can be found in media.

## 2025-03-15 PROCEDURE — 99232 SBSQ HOSP IP/OBS MODERATE 35: CPT | Performed by: NURSE PRACTITIONER

## 2025-03-15 RX ORDER — CYCLOSPORINE 0.5 MG/ML
1 EMULSION OPHTHALMIC EVERY 12 HOURS
Qty: 0.4 ML | Refills: 0 | Status: SHIPPED | OUTPATIENT
Start: 2025-03-15

## 2025-03-15 RX ORDER — AMMONIUM LACTATE 12 G/100G
LOTION TOPICAL 2 TIMES DAILY
Qty: 396 G | Refills: 0 | Status: SHIPPED | OUTPATIENT
Start: 2025-03-15

## 2025-03-15 RX ORDER — SACCHAROMYCES BOULARDII 250 MG
250 CAPSULE ORAL EVERY 8 HOURS
Qty: 18 CAPSULE | Refills: 0 | Status: SHIPPED | OUTPATIENT
Start: 2025-03-15 | End: 2025-03-21

## 2025-03-15 RX ORDER — ALBUTEROL SULFATE 90 UG/1
2 INHALANT RESPIRATORY (INHALATION) AS NEEDED
Qty: 18 G | Refills: 0 | Status: SHIPPED | OUTPATIENT
Start: 2025-03-15

## 2025-03-15 RX ORDER — CALCIUM CARBONATE 500 MG/1
500 TABLET, CHEWABLE ORAL 3 TIMES DAILY PRN
Qty: 60 TABLET | Refills: 0 | Status: SHIPPED | OUTPATIENT
Start: 2025-03-15 | End: 2025-03-18

## 2025-03-15 RX ORDER — MONTELUKAST SODIUM 10 MG/1
10 TABLET ORAL DAILY
Qty: 30 TABLET | Refills: 0 | Status: SHIPPED | OUTPATIENT
Start: 2025-03-16

## 2025-03-15 RX ORDER — DIPHENHYDRAMINE HYDROCHLORIDE AND LIDOCAINE HYDROCHLORIDE AND ALUMINUM HYDROXIDE AND MAGNESIUM HYDRO
10 KIT EVERY 4 HOURS PRN
Qty: 119 ML | Refills: 0 | Status: SHIPPED | OUTPATIENT
Start: 2025-03-15 | End: 2025-03-18

## 2025-03-15 RX ORDER — LANOLIN ALCOHOL/MO/W.PET/CERES
100 CREAM (GRAM) TOPICAL DAILY
Qty: 30 TABLET | Refills: 0 | Status: SHIPPED | OUTPATIENT
Start: 2025-03-16

## 2025-03-15 RX ORDER — PANTOPRAZOLE SODIUM 40 MG/1
40 TABLET, DELAYED RELEASE ORAL
Qty: 30 TABLET | Refills: 0 | Status: SHIPPED | OUTPATIENT
Start: 2025-03-16

## 2025-03-15 RX ORDER — CHLORHEXIDINE GLUCONATE ORAL RINSE 1.2 MG/ML
15 SOLUTION DENTAL EVERY 12 HOURS SCHEDULED
Qty: 120 ML | Refills: 0 | Status: SHIPPED | OUTPATIENT
Start: 2025-03-15

## 2025-03-15 RX ORDER — BUPRENORPHINE 15 UG/H
1 PATCH TRANSDERMAL
Qty: 4 PATCH | Refills: 0 | Status: SHIPPED | OUTPATIENT
Start: 2025-03-15 | End: 2025-04-14

## 2025-03-15 RX ORDER — CLINDAMYCIN HYDROCHLORIDE 300 MG/1
300 CAPSULE ORAL EVERY 8 HOURS SCHEDULED
Qty: 18 CAPSULE | Refills: 0 | Status: SHIPPED | OUTPATIENT
Start: 2025-03-15 | End: 2025-03-21

## 2025-03-15 RX ORDER — FOLIC ACID 1 MG/1
1 TABLET ORAL DAILY
Qty: 30 TABLET | Refills: 0 | Status: SHIPPED | OUTPATIENT
Start: 2025-03-16

## 2025-03-15 RX ORDER — CHOLECALCIFEROL (VITAMIN D3) 25 MCG
2000 TABLET ORAL DAILY
Qty: 30 TABLET | Refills: 0 | Status: SHIPPED | OUTPATIENT
Start: 2025-03-15

## 2025-03-15 RX ADMIN — CYANOCOBALAMIN TAB 500 MCG 500 MCG: 500 TAB at 08:56

## 2025-03-15 RX ADMIN — MONTELUKAST 10 MG: 10 TABLET, FILM COATED ORAL at 08:56

## 2025-03-15 RX ADMIN — DEXTRAN 70, GLYCERIN, HYPROMELLOSE 1 DROP: 1; 2; 3 SOLUTION/ DROPS OPHTHALMIC at 22:01

## 2025-03-15 RX ADMIN — CHLORHEXIDINE GLUCONATE 15 ML: 1.2 SOLUTION ORAL at 08:56

## 2025-03-15 RX ADMIN — ACETAMINOPHEN 975 MG: 325 TABLET ORAL at 11:08

## 2025-03-15 RX ADMIN — Medication 250 MG: at 14:00

## 2025-03-15 RX ADMIN — LAMOTRIGINE 50 MG: 25 TABLET ORAL at 08:56

## 2025-03-15 RX ADMIN — DEXTRAN 70, GLYCERIN, HYPROMELLOSE 1 DROP: 1; 2; 3 SOLUTION/ DROPS OPHTHALMIC at 08:56

## 2025-03-15 RX ADMIN — Medication 1 TABLET: at 08:56

## 2025-03-15 RX ADMIN — Medication 250 MG: at 21:11

## 2025-03-15 RX ADMIN — ACETAMINOPHEN 650 MG: 325 TABLET ORAL at 02:29

## 2025-03-15 RX ADMIN — FOLIC ACID 1 MG: 1 TABLET ORAL at 08:56

## 2025-03-15 RX ADMIN — Medication: at 17:53

## 2025-03-15 RX ADMIN — THIAMINE HCL TAB 100 MG 100 MG: 100 TAB at 08:56

## 2025-03-15 RX ADMIN — Medication 3 MG: at 21:11

## 2025-03-15 RX ADMIN — ARIPIPRAZOLE 10 MG: 10 TABLET ORAL at 08:55

## 2025-03-15 RX ADMIN — CHLORHEXIDINE GLUCONATE 15 ML: 1.2 SOLUTION ORAL at 22:01

## 2025-03-15 RX ADMIN — LAMOTRIGINE 100 MG: 100 TABLET ORAL at 21:10

## 2025-03-15 RX ADMIN — CLINDAMYCIN HYDROCHLORIDE 300 MG: 150 CAPSULE ORAL at 05:32

## 2025-03-15 RX ADMIN — Medication 250 MG: at 05:32

## 2025-03-15 RX ADMIN — PANTOPRAZOLE SODIUM 40 MG: 40 TABLET, DELAYED RELEASE ORAL at 05:32

## 2025-03-15 RX ADMIN — CHOLECALCIFEROL TAB 25 MCG (1000 UNIT) 2000 UNITS: 25 TAB at 08:55

## 2025-03-15 RX ADMIN — CLINDAMYCIN HYDROCHLORIDE 300 MG: 150 CAPSULE ORAL at 21:10

## 2025-03-15 RX ADMIN — DIPHENHYDRAMINE HYDROCHLORIDE AND LIDOCAINE HYDROCHLORIDE AND ALUMINUM HYDROXIDE AND MAGNESIUM HYDRO 10 ML: KIT at 02:29

## 2025-03-15 RX ADMIN — ACETAMINOPHEN 975 MG: 325 TABLET ORAL at 22:01

## 2025-03-15 RX ADMIN — DIPHENHYDRAMINE HYDROCHLORIDE AND LIDOCAINE HYDROCHLORIDE AND ALUMINUM HYDROXIDE AND MAGNESIUM HYDRO 10 ML: KIT at 11:09

## 2025-03-15 RX ADMIN — CLINDAMYCIN HYDROCHLORIDE 300 MG: 150 CAPSULE ORAL at 14:00

## 2025-03-15 NOTE — TREATMENT TEAM
03/15/25 0229   Pain Assessment   Pain Assessment Tool 0-10   Pain Score 6   Pain Location/Orientation Other (Comment)  (oral)   Pain Onset/Description Onset: Ongoing;Frequency: Intermittent;Descriptor: Aching   Hospital Pain Intervention(s) Medication (See MAR)     Patient received PRN Tylenol 650 mg for c/o moderate tooth/ oral pain as well as PRN Magic mouthwash.

## 2025-03-15 NOTE — NURSING NOTE
Patient was observed to be visible in the community this evening; spending time in the dining area with peers.  She has been calm, cooperative and pleasant during staff interactions.  She denies feeling anxious and/ or depressed, denies SI, HI and hallucinations.  Brisa was medication compliant at .  She requested and received PRN Tylenol 975 mg for c/o 8/10 oral pain which was effective on reassessment.  No further complaints voiced.  Continuous safety rounding in progress.

## 2025-03-15 NOTE — NURSING NOTE
Patient is visible in milieu for meals and group. Patient is pleasant upon interaction. Patient denies all psych symptoms. Patient is compliant with medications. No acute behaviors noted. Q 15 min safety checks maintained.

## 2025-03-15 NOTE — NURSING NOTE
Patient experienced broken sleep throughout the overnight hours; waking at approximately 0230 for PRN medications for oral pain/ discomfort.  She has been calm and cooperative.  Butrans patch intact to the upper, mid back and secured with paper tape.

## 2025-03-15 NOTE — PROGRESS NOTES
"Progress Note - Brisa Nick 59 y.o. female MRN: [id number]    Unit/Bed#: [facility] 205-01 Encounter: [id number]        Subjective:   Patient seen and examined at bedside after reviewing the chart and discussing the case with the caring staff.      Patient examined at bedside.  Patient is requesting that if she can get prescription for antibiotic for her dental infection when she goes home.    Patient is a scheduled for discharge on Monday, 3/17/2025.  Patient is requesting prescriptions.  I reviewed and reconciled patient's problem list and medications.    Physical Exam   Vitals: Blood pressure 114/54, pulse 70, temperature (!) 97.2 °F (36.2 °C), temperature source Temporal, resp. rate 16, height 5' 3\" (1.6 m), weight 75.5 kg (166 lb 6.4 oz), SpO2 97%.,Body mass index is 29.48 kg/m².  Constitutional: Patient in no acute distress.  HEENT: PERR, EOMI, MMM, left bottom molar with surrounding erythema and edema.  No palpable abscess.  No facial swelling.   Cardiovascular: Normal rate and regular rhythm.    Pulmonary/Chest: Effort normal and breath sounds normal.   Abdomen: Soft, + BS, NT.    Assessment/Plan:  Brisa Nick is a(n) 59 y.o. year old female with bipolar affective disorder.    Medical clearance.  Patient is medically cleared for discharge.  All scripts will be sent out for the patient.     Asthma.  Patient is on Singulair 10 mg daily.  Albuterol inhaler as needed.  Chronic pain syndrome.  Involving lower back and bilateral hips.  Patient follows with pain management on outpt basis and on Butrans patch weekly.  Tylenol as needed.  Nicotine dependence.  NRT.  Alcohol abuse.  Patient started thiamine, folic acid and multivitamin.  Insomnia.  Patient is on melatonin 3 mg at bedtime.  Vitamin D deficiency.  Continue vitamin D supplement.  Dry eyes.  Patient is on artificial tears twice daily.  Vitamin B12 deficiency.  Pt started on vitamin B12 500 mcg daily.   GERD. Patient started on Protonix 40 mg daily.  Dry " skin.  Apply ammonium lactate twice daily.  Overgrown toenails.  Podiatry consulted.  Constipation.  Now with diarrhea.  Hold Senokot-S and MiraLAX daily.  Given Lomotil x1 on 3/10.  Malnutrition.  Nutrition following.  Adult Malnutrition type: Chronic illness.  Adult Degree of Malnutrition: Malnutrition of moderate degree.  Malnutrition Characteristics: Inadequate energy, Weight loss.  Dental infection.  Pt started on clindamycin q8h x7 days along with Florastor (thru 3/21).  Will also add chlorhexidine swish&spit q12h.  Magic mouthwash as needed.  Pt encouraged to follow-up with dentist once discharged.

## 2025-03-15 NOTE — PROGRESS NOTES
Progress Note - Behavioral Health   Name: Brisa Nick 59 y.o. female I MRN: [id number]  Unit/Bed#: [facility] 205-01 I Date of Admission: 3/3/2025   Date of Service: 3/15/2025 I Hospital Day: 12    Assessment & Plan  Degenerative disc disease, cervical  Per medical  Migraine headache  Per medical  Fibromyalgia  Per medical   Posttraumatic stress disorder  Continue Abilify 10mg PO daily increased on 3/11/25  Continue Lamictal 100mg PO HS  Continue Lamictal 50mg PO daily  Melatonin 3mg PO HS    Crohn's colitis (HCC)  Per medical  GERD (gastroesophageal reflux disease)  Per medical  Bipolar affective disorder, depressed, severe, with psychotic behavior (HCC)  Continue Abilify 10mg PO daily increased on 3/11/25  Continue Lamictal 100mg PO HS  Continue Lamictal 50mg PO daily  Melatonin 3mg PO HS    Moderate protein-calorie malnutrition (HCC)  Malnutrition Findings:   Adult Malnutrition type: Chronic illness  Adult Degree of Malnutrition: Malnutrition of moderate degree  Malnutrition Characteristics: Inadequate energy, Weight loss  360 Statement: Malnutrition related to inadequate energy intake as evidenced by 34#(17%) weight loss from 9/10/# to 3/3/# and <75% energy intake compared to estimated needs > 1 month.    BMI Findings:      Body mass index is 29.48 kg/m².       Current medications:  Current Facility-Administered Medications   Medication Dose Route Frequency Provider Last Rate    acetaminophen  650 mg Oral Q4H PRN Mary Fernandez MD      acetaminophen  650 mg Oral Q4H PRN Mary Fernandez MD      acetaminophen  975 mg Oral Q6H PRN Mary Fernandez MD      albuterol  2 puff Inhalation Q4H PRN Brad Dailey MD      aluminum-magnesium hydroxide-simethicone  30 mL Oral Q4H PRN Mary Fernandez MD      ammonium lactate   Topical BID Evelin Rosado PA-C      ARIPiprazole  10 mg Oral Daily Cody Guillermo MD      Artificial Tears  1 drop Both Eyes Q12H Replaced by [facility] Brad Dailey MD      buprenorphine  1 patch  Transdermal Q7 Days Brad Dailey MD      calcium carbonate  500 mg Oral TID PRN Evelin L Dagnall, PA-C      chlorhexidine  15 mL Swish & Spit Q12H LYDIA Evelin L Dagnall, PA-C      cholecalciferol  2,000 Units Oral Daily Mary Fernandez MD      clindamycin  300 mg Oral Q8H LYDIA Evelin L Dagnall, PA-C      cyanocobalamin  500 mcg Oral Daily Evelin L Dagnall, PA-C      diphenhydramine, lidocaine, Al/Mg hydroxide, simethicone  10 mL Swish & Spit Q4H PRN Evelin L Dagnall, PA-C      folic acid  1 mg Oral Daily Brad Dailey MD      haloperidol lactate  5 mg Intramuscular Q4H PRN Max 4/day Mary Fernandez MD      hydrOXYzine HCL  25 mg Oral Q6H PRN Max 4/day Mary Fernandez MD      hydrOXYzine HCL  50 mg Oral Q6H PRN Max 4/day Mary Fernandez MD      lamoTRIgine  100 mg Oral HS Cody Guillermo MD      lamoTRIgine  50 mg Oral Daily Cody Guillermo MD      LORazepam  0.5 mg Oral Q8H PRN Khoi Beltran MD      melatonin  3 mg Oral HS Mary Fernandez MD      montelukast  10 mg Oral Daily Brad Dailey MD      multivitamin-minerals  1 tablet Oral Daily Brad Dailey MD      nicotine polacrilex  4 mg Oral Q2H PRN Mary Fernandez MD      OLANZapine  5 mg Oral TID PRN Khoi Beltran MD      pantoprazole  40 mg Oral Early Morning Brad Dailey MD      propranolol  5 mg Oral Q8H PRN Mary Fernandez MD      risperiDONE  0.5 mg Oral Q4H PRN Max 6/day Khoi Beltran MD      risperiDONE  1 mg Oral Q4H PRN Max 3/day Khoi Beltran MD      saccharomyces boulardii  250 mg Oral Q8H Evelin L Dagnall, PA-C      thiamine  100 mg Oral Daily Brad Dailey MD      traZODone  50 mg Oral HS PRN Khoi Bletran MD          Risks/Benefits of Treatment:     Risks, benefits, and possible side effects of medications explained to patient and patient verbalizes understanding and agreement for treatment.    Progress Toward Goals: improving    Treatment Planning:      - Encourage early mobility and having a structured day  - Provide frequent  re-orientation, and cognitive stimulation  - Ensure assistive devices are in proper working order (eye-glasses, hearing aids)  - Encourage adequate hydration, nutrition and monitor bowel movements  - Maintain sleep-wake cycle: Uninterrupted sleep time; low-level lighting at night  - Fall precaution  - f/u SLIM recs regarding the medical problems   - Continue medication titration and treatment plan; adjust medication to optimize treatment response and as clinically indicated.   - Observation:Routine  - Legal Status: 302  - VS: as per unit protocol  - Encourage group attendance and milieu therapy  - Dispo: To be determined and Tentative discharge on 3/17/25 to home  -   - Estimated Discharge Day: 3/18/2025     Subjective       Patient was visited on unit for continuing care; chart reviewed and discussed with the nursing staff.  Brisa reports she is looking forward to d/c on Monday. She states she is doing well, sleeping well and good appetite. She denies any SI/HI. She has no auditory or visual hallucinations. She reports she will return to work as a  at [employer] and will return home where she lives with her youngest son. She states that her main stressor will be to cut ties with a significant other, but has plans to maintain her own and his safety when this is completed.  She states that she is otherwise doing well on the medications, is aware of her follow ups scheduled for discharge, and is looking forward to going home.  Calm, pleasant, and cooperative in conversation at this time.  Patient continues to be visible in the milieu and interacts with staff and peers. No reports of aggression or self-injurious behavior on unit. No PRN medications used in the past 24 hours.    Patient accepted all offered medications and no adverse effects of medications noted or reported.    Sleep:  good  Appetite: normal  Medication side effects:  reports some ankle swelling from abilify  ROS: review of systems as noted above  "in HPI/Subjective report, all other systems are negative    Objective :  Temp:  [97.2 °F (36.2 °C)-98.1 °F (36.7 °C)] 97.2 °F (36.2 °C)  HR:  [70-87] 70  BP: (114-117)/(54-58) 114/54  Resp:  [16-18] 16  SpO2:  [96 %-99 %] 97 %  O2 Device: None (Room air)    Temp:  [97.2 °F (36.2 °C)-98.1 °F (36.7 °C)] 97.2 °F (36.2 °C)  HR:  [70-87] 70  BP: (114-117)/(54-58) 114/54  Resp:  [16-18] 16  SpO2:  [96 %-99 %] 97 %  O2 Device: None (Room air)    Mental Status Evaluation:    Appearance:  age appropriate, casually dressed   Behavior:  pleasant, cooperative, calm   Speech:  normal rate, normal volume, normal pitch, spontaneous   Mood:  euthymic \"good\"   Affect:  appropriate, mood-congruent   Thought Process:  organized, goal directed, logical   Thought Content:  no overt delusions   Perceptual Disturbances: no auditory hallucinations, no visual hallucinations   Risk Potential: Suicidal Ideation -  None at present  Homicidal Ideation -  None  Potential for Aggression - No   Sensorium:  oriented to person, place, and time/date   Memory:  recent and remote memory grossly intact   Consciousness:  alert and awake   Attention/Concentration: attention span and concentration are age appropriate   Insight:  improved and fair   Judgment: improved and fair   Gait/Station: normal gait/station, normal balance   Motor Activity: no abnormal movements       Pain : reported having pain in mouth, receiving antibiotics        Lab Results: I have reviewed the following results:  Results from the past 24 hours: No results found for this or any previous visit (from the past 24 hours).    Administrative Statements     Counseling / Coordination of Care:   Patient's progress reviewed with nursing staff.  Medication changes reviewed with nursing staff.  Medication education provided to patient.  Patient's diagnosis and treatment indicated reviewed with patient.  Recent stressors discussed with patient including break up with significant other and " everyday stressors.  Coping skills including learning relapse prevention skills, maintain positive attitude, spending time with family, and spending time with friends reviewed with patient.  Importance of medication and treatment compliance reviewed with patient.  Discussed with patient acceptance of mental illness diagnosis and need for ongoing treatment after discharge.  Discharge plan discussed with patient..    SMITA Weaver 03/15/25

## 2025-03-15 NOTE — NURSING NOTE
Patient informs she was able to speak with her daughter tonight and is now able to obtain transportation by daughter for Monday, 3/17 at 10 am.  Informed patient that information would be passed along in our nursing report so provider can address on Monday morning; patient verbalizes acceptance.

## 2025-03-16 PROCEDURE — 99232 SBSQ HOSP IP/OBS MODERATE 35: CPT | Performed by: NURSE PRACTITIONER

## 2025-03-16 RX ADMIN — LAMOTRIGINE 50 MG: 25 TABLET ORAL at 08:33

## 2025-03-16 RX ADMIN — Medication: at 17:49

## 2025-03-16 RX ADMIN — CHOLECALCIFEROL TAB 25 MCG (1000 UNIT) 2000 UNITS: 25 TAB at 08:34

## 2025-03-16 RX ADMIN — Medication 250 MG: at 05:59

## 2025-03-16 RX ADMIN — MONTELUKAST 10 MG: 10 TABLET, FILM COATED ORAL at 08:34

## 2025-03-16 RX ADMIN — CYANOCOBALAMIN TAB 500 MCG 500 MCG: 500 TAB at 08:33

## 2025-03-16 RX ADMIN — CLINDAMYCIN HYDROCHLORIDE 300 MG: 150 CAPSULE ORAL at 05:59

## 2025-03-16 RX ADMIN — CHLORHEXIDINE GLUCONATE 15 ML: 1.2 SOLUTION ORAL at 08:33

## 2025-03-16 RX ADMIN — PANTOPRAZOLE SODIUM 40 MG: 40 TABLET, DELAYED RELEASE ORAL at 05:59

## 2025-03-16 RX ADMIN — LAMOTRIGINE 100 MG: 100 TABLET ORAL at 23:13

## 2025-03-16 RX ADMIN — CLINDAMYCIN HYDROCHLORIDE 300 MG: 150 CAPSULE ORAL at 15:34

## 2025-03-16 RX ADMIN — THIAMINE HCL TAB 100 MG 100 MG: 100 TAB at 08:37

## 2025-03-16 RX ADMIN — Medication 250 MG: at 15:34

## 2025-03-16 RX ADMIN — ARIPIPRAZOLE 10 MG: 10 TABLET ORAL at 08:34

## 2025-03-16 RX ADMIN — Medication 1 TABLET: at 08:34

## 2025-03-16 RX ADMIN — Medication 3 MG: at 23:13

## 2025-03-16 RX ADMIN — FOLIC ACID 1 MG: 1 TABLET ORAL at 08:34

## 2025-03-16 RX ADMIN — CLINDAMYCIN HYDROCHLORIDE 300 MG: 150 CAPSULE ORAL at 23:13

## 2025-03-16 RX ADMIN — DEXTRAN 70, GLYCERIN, HYPROMELLOSE 1 DROP: 1; 2; 3 SOLUTION/ DROPS OPHTHALMIC at 23:13

## 2025-03-16 RX ADMIN — Medication 250 MG: at 23:13

## 2025-03-16 RX ADMIN — CHLORHEXIDINE GLUCONATE 15 ML: 1.2 SOLUTION ORAL at 23:13

## 2025-03-16 NOTE — TREATMENT TEAM
03/15/25 2201   Pain Assessment   Pain Assessment Tool 0-10   Pain Score 8   Pain Location/Orientation Location: Hip;Orientation: Left   Pain Radiating Towards no   Pain Onset/Description Onset: Ongoing   Effect of Pain on Daily Activities mood   Patient's Stated Pain Goal No pain   Hospital Pain Intervention(s) Medication (See MAR)   Multiple Pain Sites No   POSS Assessment   [place] Opioid-Induced Sedation Scale (POSS) 1     975 mg Acetaminophen administered as ordered per pt's request. Will continued to monitor. Safety checks continued.

## 2025-03-16 NOTE — NURSING NOTE
BLEPS complete, see flowsheet. Pt visible on unit and social with peers. She is calm, pleasant, and cooperative. Pt brightens on approach. She denies SI/HI/AVH. Pt states she is looking forward to being discharged tomorrow morning. No acute behaviors noted. Continuous monitoring maintained.

## 2025-03-16 NOTE — NURSING NOTE
Pt was visible on the unit and social with peers. Compliant with medication. Cooperative with staff during care. Endorsed mild depression. Irritable and tearful at times. Denied SI, HI, or AVH. Eye contact was good.  Speech pattern was normal and relaxed. Appearance and hygiene was unremarkable. Safety checks continued.

## 2025-03-16 NOTE — ASSESSMENT & PLAN NOTE
Malnutrition Findings:   Adult Malnutrition type: Chronic illness  Adult Degree of Malnutrition: Malnutrition of moderate degree  Malnutrition Characteristics: Inadequate energy, Weight loss  360 Statement: Malnutrition related to inadequate energy intake as evidenced by 34#(17%) weight loss from 9/10/# to 3/3/# and <75% energy intake compared to estimated needs > 1 month.    BMI Findings:      Body mass index is 29.48 kg/m².

## 2025-03-16 NOTE — ASSESSMENT & PLAN NOTE
Continue Lamictal 100 mg po HS, and 50mg PO daily, for mood stabilization  Abilify 10 mg po daily increased 3/11/25 for mood stabilization  Melatonin 3mg PO HS for sleep

## 2025-03-16 NOTE — PROGRESS NOTES
Progress Note - Behavioral Health   Name: Brisa Nick 59 y.o. female I MRN: [id number]  Unit/Bed#: [facility] 205-01 I Date of Admission: 3/3/2025   Date of Service: 3/16/2025 I Hospital Day: 13    Assessment & Plan  Degenerative disc disease, cervical    Migraine headache    Fibromyalgia    Posttraumatic stress disorder  Continue Lamictal 100 mg po HS, and 50mg PO daily, for mood stabilization  Abilify 10 mg po daily increased 3/11/25 for mood stabilization  Melatonin 3mg PO HS for sleep  Crohn's colitis (HCC)    GERD (gastroesophageal reflux disease)    Bipolar affective disorder, depressed, severe, with psychotic behavior (HCC)  Continue Lamictal 100 mg po HS, and 50mg PO daily, for mood stabilization  Abilify 10 mg po daily increased 3/11/25 for mood stabilization  Melatonin 3mg PO HS for sleep    Moderate protein-calorie malnutrition (HCC)  Malnutrition Findings:   Adult Malnutrition type: Chronic illness  Adult Degree of Malnutrition: Malnutrition of moderate degree  Malnutrition Characteristics: Inadequate energy, Weight loss  360 Statement: Malnutrition related to inadequate energy intake as evidenced by 34#(17%) weight loss from 9/10/# to 3/3/# and <75% energy intake compared to estimated needs > 1 month.    BMI Findings:      Body mass index is 29.48 kg/m².       Current medications:  Current Facility-Administered Medications   Medication Dose Route Frequency Provider Last Rate    acetaminophen  650 mg Oral Q4H PRN Mary Fernandez MD      acetaminophen  650 mg Oral Q4H PRN Mary Fernandez MD      acetaminophen  975 mg Oral Q6H PRN Mary Fernandez MD      albuterol  2 puff Inhalation Q4H PRN Brad Dailey MD      aluminum-magnesium hydroxide-simethicone  30 mL Oral Q4H PRN Mary Fernandez MD      ammonium lactate   Topical BID Evelin Rosado PA-C      ARIPiprazole  10 mg Oral Daily Cody Guillermo MD      Artificial Tears  1 drop Both Eyes Q12H [facility] Brad Dailey MD      buprenorphine  1  patch Transdermal Q7 Days Brad Dailey MD      calcium carbonate  500 mg Oral TID PRN Evelin L Dagnall, PA-C      chlorhexidine  15 mL Swish & Spit Q12H LYDIA Evelin L Dagnall, PA-C      cholecalciferol  2,000 Units Oral Daily Mary Fernandez MD      clindamycin  300 mg Oral Q8H LYDIA Evelin L Dagnall, PA-C      cyanocobalamin  500 mcg Oral Daily Evelin L Dagnall, PA-C      diphenhydramine, lidocaine, Al/Mg hydroxide, simethicone  10 mL Swish & Spit Q4H PRN Evelin L Dagnall, PA-C      folic acid  1 mg Oral Daily Brad Dailey MD      haloperidol lactate  5 mg Intramuscular Q4H PRN Max 4/day Mary Fernandez MD      hydrOXYzine HCL  25 mg Oral Q6H PRN Max 4/day Mary Fernandez MD      hydrOXYzine HCL  50 mg Oral Q6H PRN Max 4/day Mary Fernandez MD      lamoTRIgine  100 mg Oral HS Cody Guillermo MD      lamoTRIgine  50 mg Oral Daily Cody Guillermo MD      LORazepam  0.5 mg Oral Q8H PRN Khoi Beltran MD      melatonin  3 mg Oral HS Mary Fernandez MD      montelukast  10 mg Oral Daily Brad Dailey MD      multivitamin-minerals  1 tablet Oral Daily Brad Dailey MD      nicotine polacrilex  4 mg Oral Q2H PRN Mary Fernandez MD      OLANZapine  5 mg Oral TID PRN Khoi Beltran MD      pantoprazole  40 mg Oral Early Morning Brad Dailey MD      propranolol  5 mg Oral Q8H PRN Mary Fernandez MD      risperiDONE  0.5 mg Oral Q4H PRN Max 6/day Khoi Beltran MD      risperiDONE  1 mg Oral Q4H PRN Max 3/day Khoi Beltran MD      saccharomyces boulardii  250 mg Oral Q8H Evelin L Dagnall, PA-C      thiamine  100 mg Oral Daily Brad Dailey MD      traZODone  50 mg Oral HS PRN Khoi Beltran MD          Risks/Benefits of Treatment:     Risks, benefits, and possible side effects of medications explained to patient and patient verbalizes understanding and agreement for treatment.    Progress Toward Goals: improving    Treatment Planning:      - Encourage early mobility and having a structured day  - Provide frequent  re-orientation, and cognitive stimulation  - Ensure assistive devices are in proper working order (eye-glasses, hearing aids)  - Encourage adequate hydration, nutrition and monitor bowel movements  - Maintain sleep-wake cycle: Uninterrupted sleep time; low-level lighting at night  - Fall precaution  - f/u SLIM recs regarding the medical problems   - Continue medication titration and treatment plan; adjust medication to optimize treatment response and as clinically indicated.   - Observation:Routine  - Legal Status: 302  - VS: as per unit protocol  - Encourage group attendance and milieu therapy  - Dispo: Tentative discharge tomorrow and daughter will be picking her up at 10am  - Long Stay Certification : Not Applicable  - Estimated Discharge Day: 3/18/2025     Subjective       Patient was visited on unit for continuing care; chart reviewed and discussed with the nursing staff.  Brisa reports today that she is doing well. She denies SI/HI. Denies auditory and visual hallucinations. She is out of bed, visible in the milieu and appropriate in conversation. She denies any depression or anxiety. She is looking forward to d/c tomorrow.  Reports from nursing and patient state that her daughter will be picking her up at 10am.  She states she is ready and feels good about d/c.  Continues to report she has all of her follow ups and will attend her appointments as scheduled.     Patient continues to be visible in the milieu and interacts with staff and peers. No reports of aggression or self-injurious behavior on unit. No PRN medications used in the past 24 hours.    Patient accepted all offered medications and no adverse effects of medications noted or reported.    Sleep: normal  Appetite: normal  Medication side effects: No  ROS: review of systems as noted above in HPI/Subjective report, all other systems are negative    Objective :  Temp:  [97.5 °F (36.4 °C)-97.7 °F (36.5 °C)] 97.5 °F (36.4 °C)  HR:  [70-96] 70  BP:  (102-122)/(58-68) 122/66  Resp:  [18] 18  SpO2:  [95 %-96 %] 95 %  O2 Device: None (Room air)    Temp:  [97.5 °F (36.4 °C)-97.7 °F (36.5 °C)] 97.5 °F (36.4 °C)  HR:  [70-96] 70  BP: (102-122)/(58-68) 122/66  Resp:  [18] 18  SpO2:  [95 %-96 %] 95 %  O2 Device: None (Room air)    Mental Status Evaluation:    Appearance:  age appropriate, casually dressed   Behavior:  pleasant, cooperative, calm   Speech:  normal rate, normal volume, normal pitch   Mood:  euthymic   Affect:  appropriate, mood-congruent   Thought Process:  goal directed, logical, coherent   Thought Content:  no overt delusions   Perceptual Disturbances: no auditory hallucinations, no visual hallucinations   Risk Potential: Suicidal Ideation -  None at present  Homicidal Ideation -  None at present  Potential for Aggression - No   Sensorium:  oriented to person, place, and time/date   Memory:  recent and remote memory grossly intact   Consciousness:  alert and awake   Attention/Concentration: attention span and concentration are age appropriate   Insight:  intact   Judgment: intact   Gait/Station: normal gait/station, normal balance   Motor Activity: no abnormal movements               Lab Results: I have reviewed the following results:  Most Recent Labs:   Lab Results   Component Value Date    WBC 8.67 03/07/2025    RBC 4.73 03/07/2025    HGB 13.7 03/07/2025    HCT 42.6 03/07/2025     03/07/2025    RDW 12.4 03/07/2025    NEUTROABS 5.25 03/07/2025    SODIUM 139 03/07/2025    K 4.0 03/07/2025     03/07/2025    CO2 31 03/07/2025    BUN 19 03/07/2025    CREATININE 0.72 03/07/2025    GLUC 101 03/07/2025    CALCIUM 9.4 03/07/2025    AST 16 03/07/2025    ALT 16 03/07/2025    ALKPHOS 49 03/07/2025    TP 6.2 (L) 03/07/2025    ALB 3.9 03/07/2025    TBILI 0.37 03/07/2025    CHOLESTEROL 146 03/04/2025    HDL 37 (L) 03/04/2025    TRIG 128 03/04/2025    LDLCALC 83 03/04/2025    NONHDLC 109 03/04/2025    YWF2ULDNMHEV 0.326 (L) 03/02/2025    FREET4 0.97  03/02/2025    HGBA1C 5.8 (H) 01/21/2025     01/21/2025       Administrative Statements     Counseling / Coordination of Care:   Patient's progress reviewed with nursing staff.  Educated on importance of medication and treatment compliance.  Supportive therapy provided to patient.  Discharge plan discussed with patient..    SMITA Weaver 03/16/25

## 2025-03-16 NOTE — PLAN OF CARE
Problem: Ineffective Coping  Goal: Free from restraint events  Description: - Utilize least restrictive measures   - Provide behavioral interventions   - Redirect inappropriate behaviors   Outcome: Progressing     Problem: Risk for Self Injury/Neglect  Goal: Refrain from harming self  Description: Interventions:  - Monitor patient closely, per order  - Develop a trusting relationship  - Supervise medication ingestion, monitor effects and side effects   Outcome: Progressing

## 2025-03-16 NOTE — TREATMENT TEAM
03/15/25 2302   Pain Assessment Post Intervention   Reason Not Indicated Sleeping / Easy to arouse   Pain Assessment Tool Used: 0-10   Post Intervention Pain Score No Pain   Post Intervention Pain Location/Orientation Orientation: Left;Location: Hip   Post Intervention POSS S   Response to Interventions effective     Follow up Acetaminophen 975 mg, effective. Pt observed resting in bed. No sign of pain or discomfort. Breathing calm and even. No SOB or labored breathing. Will continue to monitor. Safety checks continued.

## 2025-03-16 NOTE — PROGRESS NOTES
"Progress Note - Brisa Nick 59 y.o. female MRN: [id number]    Unit/Bed#: [facility] 205-01 Encounter: [id number]        Subjective:   Patient seen and examined at bedside after reviewing the chart and discussing the case with the caring staff.      Patient examined at bedside.  Patient is requesting that if she can get prescription for antibiotic for her dental infection when she goes home.    Patient is a scheduled for discharge on Monday, 3/17/2025.  Patient is requesting prescriptions.  I reviewed and reconciled patient's problem list and medications.    Physical Exam   Vitals: Blood pressure 122/66, pulse 70, temperature 97.5 °F (36.4 °C), temperature source Temporal, resp. rate 18, height 5' 3\" (1.6 m), weight 75.5 kg (166 lb 6.4 oz), SpO2 95%.,Body mass index is 29.48 kg/m².  Constitutional: Patient in no acute distress.  HEENT: PERR, EOMI, MMM, left bottom molar with surrounding erythema and edema.  No palpable abscess.  No facial swelling.   Cardiovascular: Normal rate and regular rhythm.    Pulmonary/Chest: Effort normal and breath sounds normal.   Abdomen: Soft, + BS, NT.    Assessment/Plan:  Brisa Nick is a(n) 59 y.o. year old female with bipolar affective disorder.    Medical clearance.  Patient is medically cleared for discharge.  All scripts will be sent out for the patient.     Asthma.  Patient is on Singulair 10 mg daily.  Albuterol inhaler as needed.  Chronic pain syndrome.  Involving lower back and bilateral hips.  Patient follows with pain management on outpt basis and on Butrans patch weekly.  Tylenol as needed.  Nicotine dependence.  NRT.  Alcohol abuse.  Patient started thiamine, folic acid and multivitamin.  Insomnia.  Patient is on melatonin 3 mg at bedtime.  Vitamin D deficiency.  Continue vitamin D supplement.  Dry eyes.  Patient is on artificial tears twice daily.  Vitamin B12 deficiency.  Pt started on vitamin B12 500 mcg daily.   GERD. Patient started on Protonix 40 mg daily.  Dry skin.  " Apply ammonium lactate twice daily.  Overgrown toenails.  Podiatry consulted.  Constipation.  Now with diarrhea.  Hold Senokot-S and MiraLAX daily.  Given Lomotil x1 on 3/10.  Malnutrition.  Nutrition following.  Adult Malnutrition type: Chronic illness.  Adult Degree of Malnutrition: Malnutrition of moderate degree.  Malnutrition Characteristics: Inadequate energy, Weight loss.  Dental infection.  Pt started on clindamycin q8h x7 days along with Florastor (thru 3/21).  Will also add chlorhexidine swish&spit q12h.  Magic mouthwash as needed.  Pt encouraged to follow-up with dentist once discharged.

## 2025-03-17 PROCEDURE — 99232 SBSQ HOSP IP/OBS MODERATE 35: CPT | Performed by: PSYCHIATRY & NEUROLOGY

## 2025-03-17 RX ORDER — LAMOTRIGINE 25 MG/1
50 TABLET ORAL DAILY
Qty: 30 TABLET | Refills: 0 | Status: SHIPPED | OUTPATIENT
Start: 2025-03-18

## 2025-03-17 RX ORDER — LAMOTRIGINE 100 MG/1
100 TABLET ORAL
Qty: 30 TABLET | Refills: 0 | Status: SHIPPED | OUTPATIENT
Start: 2025-03-17

## 2025-03-17 RX ORDER — ARIPIPRAZOLE 10 MG/1
10 TABLET ORAL DAILY
Qty: 30 TABLET | Refills: 0 | Status: SHIPPED | OUTPATIENT
Start: 2025-03-18

## 2025-03-17 RX ADMIN — FOLIC ACID 1 MG: 1 TABLET ORAL at 08:44

## 2025-03-17 RX ADMIN — LAMOTRIGINE 50 MG: 25 TABLET ORAL at 08:44

## 2025-03-17 RX ADMIN — CLINDAMYCIN HYDROCHLORIDE 300 MG: 150 CAPSULE ORAL at 05:25

## 2025-03-17 RX ADMIN — BUPRENORPHINE 1 PATCH: 15 PATCH, EXTENDED RELEASE TRANSDERMAL at 08:44

## 2025-03-17 RX ADMIN — Medication 1 TABLET: at 08:44

## 2025-03-17 RX ADMIN — ACETAMINOPHEN 975 MG: 325 TABLET ORAL at 21:28

## 2025-03-17 RX ADMIN — Medication 250 MG: at 21:31

## 2025-03-17 RX ADMIN — CLINDAMYCIN HYDROCHLORIDE 300 MG: 150 CAPSULE ORAL at 17:01

## 2025-03-17 RX ADMIN — LAMOTRIGINE 100 MG: 100 TABLET ORAL at 21:30

## 2025-03-17 RX ADMIN — Medication 250 MG: at 17:01

## 2025-03-17 RX ADMIN — ARIPIPRAZOLE 10 MG: 10 TABLET ORAL at 08:44

## 2025-03-17 RX ADMIN — DEXTRAN 70, GLYCERIN, HYPROMELLOSE 1 DROP: 1; 2; 3 SOLUTION/ DROPS OPHTHALMIC at 21:29

## 2025-03-17 RX ADMIN — PANTOPRAZOLE SODIUM 40 MG: 40 TABLET, DELAYED RELEASE ORAL at 05:25

## 2025-03-17 RX ADMIN — THIAMINE HCL TAB 100 MG 100 MG: 100 TAB at 08:44

## 2025-03-17 RX ADMIN — CLINDAMYCIN HYDROCHLORIDE 300 MG: 150 CAPSULE ORAL at 21:30

## 2025-03-17 RX ADMIN — CYANOCOBALAMIN TAB 500 MCG 500 MCG: 500 TAB at 08:44

## 2025-03-17 RX ADMIN — CHLORHEXIDINE GLUCONATE 15 ML: 1.2 SOLUTION ORAL at 08:45

## 2025-03-17 RX ADMIN — Medication 250 MG: at 05:25

## 2025-03-17 RX ADMIN — MONTELUKAST 10 MG: 10 TABLET, FILM COATED ORAL at 08:44

## 2025-03-17 RX ADMIN — CHLORHEXIDINE GLUCONATE 15 ML: 1.2 SOLUTION ORAL at 21:29

## 2025-03-17 RX ADMIN — DEXTRAN 70, GLYCERIN, HYPROMELLOSE 1 DROP: 1; 2; 3 SOLUTION/ DROPS OPHTHALMIC at 08:43

## 2025-03-17 RX ADMIN — Medication 3 MG: at 21:30

## 2025-03-17 RX ADMIN — CHOLECALCIFEROL TAB 25 MCG (1000 UNIT) 2000 UNITS: 25 TAB at 08:44

## 2025-03-17 NOTE — PROGRESS NOTES
Progress Note - Behavioral Health   Name: Brisa Nick 59 y.o. female I MRN: [id number]   Unit/Bed#: [facility] 205-01 I Date of Admission: 3/3/2025   Date of Service: 3/17/2025 I Hospital Day: 14         Assessment & Plan  Degenerative disc disease, cervical    Fibromyalgia    Posttraumatic stress disorder    GERD (gastroesophageal reflux disease)    Bipolar affective disorder, depressed, severe, with psychotic behavior (HCC)    Moderate protein-calorie malnutrition (HCC)  Malnutrition Findings:   Adult Malnutrition type: Chronic illness  Adult Degree of Malnutrition: Malnutrition of moderate degree  Malnutrition Characteristics: Inadequate energy, Weight loss  360 Statement: Malnutrition related to inadequate energy intake as evidenced by 34#(17%) weight loss from 9/10/# to 3/3/# and <75% energy intake compared to estimated needs > 1 month.    BMI Findings:      Body mass index is 29.48 kg/m².       Recommended Treatment:     Planned medication and treatment changes:    All current active medications have been reviewed  Encourage group therapy, milieu therapy and occupational therapy  Behavioral Health checks for safety monitoring  Discharge planned for tomorrow  Ct with current meds regimen.    Current medications:  Current Facility-Administered Medications   Medication Dose Route Frequency Provider Last Rate    acetaminophen  650 mg Oral Q4H PRN Mary Fernandez MD      acetaminophen  650 mg Oral Q4H PRN Mary Fernandez MD      acetaminophen  975 mg Oral Q6H PRN Mary Fernandez MD      albuterol  2 puff Inhalation Q4H PRN Brad Dailey MD      aluminum-magnesium hydroxide-simethicone  30 mL Oral Q4H PRN Mary Fernandez MD      ammonium lactate   Topical BID Evelin Rosado PA-C      ARIPiprazole  10 mg Oral Daily Cody Guillermo MD      Artificial Tears  1 drop Both Eyes Q12H [facility] Bard Dailey MD      buprenorphine  1 patch Transdermal Q7 Days Brad Dailey MD      calcium carbonate  500 mg Oral  "TID PRN Evelin L Dagnall, PA-C      chlorhexidine  15 mL Swish & Spit Q12H LYDIA Evelin L Dagnall, PA-C      cholecalciferol  2,000 Units Oral Daily Mary Fernandez MD      clindamycin  300 mg Oral Q8H LYDIA Evelin L Dagnall, PA-C      cyanocobalamin  500 mcg Oral Daily Evelin L Dagnall, PA-C      diphenhydramine, lidocaine, Al/Mg hydroxide, simethicone  10 mL Swish & Spit Q4H PRN Evelin L Dagnall, PA-C      folic acid  1 mg Oral Daily Brad Dailey MD      haloperidol lactate  5 mg Intramuscular Q4H PRN Max 4/day Mary Fernandez MD      hydrOXYzine HCL  25 mg Oral Q6H PRN Max 4/day Mary Fernandez MD      hydrOXYzine HCL  50 mg Oral Q6H PRN Max 4/day Mary Fernandez MD      lamoTRIgine  100 mg Oral HS Cody Guillermo MD      lamoTRIgine  50 mg Oral Daily Cody Guillermo MD      LORazepam  0.5 mg Oral Q8H PRN Khoi Beltran MD      melatonin  3 mg Oral HS Mary Fernandez MD      montelukast  10 mg Oral Daily Brad Dailey MD      multivitamin-minerals  1 tablet Oral Daily Brad Dailey MD      nicotine polacrilex  4 mg Oral Q2H PRN Mary Fernandez MD      OLANZapine  5 mg Oral TID PRN Khoi Beltran MD      pantoprazole  40 mg Oral Early Morning Brad Dailey MD      propranolol  5 mg Oral Q8H PRN Mary Fernandez MD      risperiDONE  0.5 mg Oral Q4H PRN Max 6/day Khoi Beltran MD      risperiDONE  1 mg Oral Q4H PRN Max 3/day Khoi Beltran MD      saccharomyces boulardii  250 mg Oral Q8H Evelin L Dagnall, PA-C      thiamine  100 mg Oral Daily Brad Dailey MD      traZODone  50 mg Oral HS PRN Khoi Beltran MD         Risks / Benefits of Treatment:    Risks, benefits, and possible side effects of medications explained to patient and patient verbalizes understanding and agreement for treatment.    Subjective:    Behavior over the last 24 hours: improved    Brisa was seen for continuing care. Patient appears brighter, cooperative and spontaneous in her speech. States she feels \"better\" and participated in groups " this morning. She has been adherence to her meds and denies any current side effects. Denies any suicidal ideation or wish to die and feel ready for discharge. Meds education and discharge plan were discussed. Patient verbalized understanding and agreement with the plan.    Sleep: improved   Appetite: fair  Medication side effects: No   ROS: no complaints, all other systems are negative    Mental Status Evaluation:    Appearance:  age appropriate, adequate grooming   Behavior:  cooperative, calm   Speech:  normal rate and volume   Mood:  improved   Affect:  constricted   Thought Process:  goal directed   Associations: circumstantial associations   Thought Content:  no overt delusions   Perceptual Disturbances: appears preoccupied   Risk Potential: Suicidal ideation - None at present  Homicidal ideation - None at present  Potential for aggression - Not at present   Sensorium:  oriented to person, place, and time/date   Memory:  recent and remote memory: unable to assess due to lack of cooperation   Consciousness:  alert and awake   Attention/Concentration: attention span and concentration are age appropriate   Insight:  fair   Judgment: fair   Gait/Station: normal gait/station   Motor Activity: no abnormal movements     Vital signs in last 24 hours:    Temp:  [97.5 °F (36.4 °C)-98.7 °F (37.1 °C)] 98 °F (36.7 °C)  HR:  [84-96] 84  BP: (108-116)/(56-57) 110/57  Resp:  [16-18] 16  SpO2:  [94 %-96 %] 94 %  O2 Device: None (Room air)         Laboratory results: I have personally reviewed all pertinent laboratory/tests results    Most Recent Labs:   Lab Results   Component Value Date    WBC 8.67 03/07/2025    RBC 4.73 03/07/2025    HGB 13.7 03/07/2025    HCT 42.6 03/07/2025     03/07/2025    RDW 12.4 03/07/2025    NEUTROABS 5.25 03/07/2025    SODIUM 139 03/07/2025    K 4.0 03/07/2025     03/07/2025    CO2 31 03/07/2025    BUN 19 03/07/2025    CREATININE 0.72 03/07/2025    GLUC 101 03/07/2025    CALCIUM 9.4  03/07/2025    AST 16 03/07/2025    ALT 16 03/07/2025    ALKPHOS 49 03/07/2025    TP 6.2 (L) 03/07/2025    ALB 3.9 03/07/2025    TBILI 0.37 03/07/2025    CHOLESTEROL 146 03/04/2025    HDL 37 (L) 03/04/2025    TRIG 128 03/04/2025    LDLCALC 83 03/04/2025    NONHDLC 109 03/04/2025    CVA2PULPRHQZ 0.326 (L) 03/02/2025    FREET4 0.97 03/02/2025    TREPONEMAPA Non-reactive 03/04/2025    HGBA1C 5.8 (H) 01/21/2025     01/21/2025       Counseling / Coordination of Care:    Patient's progress discussed with staff in treatment team meeting.  Medication changes reviewed with staff in treatment team meeting.  Supportive therapy provided to patient.  Group attendance encouraged.  Discharge plan discussed with patient.    Khoi Beltran MD 03/17/25

## 2025-03-17 NOTE — PROGRESS NOTES
"Progress Note - Brisa Nick 59 y.o. female MRN: [id number]    Unit/Bed#: [facility] 205-01 Encounter: [id number]        Subjective:   Patient seen and examined at bedside after reviewing the chart and discussing the case with the caring staff.      Patient examined at bedside.  Patient is requesting that if she can get prescription for antibiotic for her dental infection when she goes home.    Patient is being discharged tomorrow 3/18/2025.  Patient is requesting prescriptions.  I reviewed and reconciled patient's problem list and medications.    Physical Exam   Vitals: Blood pressure 110/57, pulse 84, temperature 98 °F (36.7 °C), temperature source Temporal, resp. rate 16, height 5' 3\" (1.6 m), weight 75.5 kg (166 lb 6.4 oz), SpO2 94%.,Body mass index is 29.48 kg/m².  Constitutional: Patient in no acute distress.  HEENT: PERR, EOMI, MMM, left bottom molar with surrounding erythema and edema.  No palpable abscess.  No facial swelling.   Cardiovascular: Normal rate and regular rhythm.    Pulmonary/Chest: Effort normal and breath sounds normal.   Abdomen: Soft, + BS, NT.    Assessment/Plan:  Brisa Nick is a(n) 59 y.o. year old female with bipolar affective disorder.    Medical clearance.  Patient is medically cleared for discharge.  All scripts will be sent out for the patient.     Asthma.  Patient is on Singulair 10 mg daily.  Albuterol inhaler as needed.  Chronic pain syndrome.  Involving lower back and bilateral hips.  Patient follows with pain management on outpt basis and on Butrans patch weekly.  Tylenol as needed.  Nicotine dependence.  NRT.  Alcohol abuse.  Patient started thiamine, folic acid and multivitamin.  Insomnia.  Patient is on melatonin 3 mg at bedtime.  Vitamin D deficiency.  Continue vitamin D supplement.  Dry eyes.  Patient is on artificial tears twice daily.  Vitamin B12 deficiency.  Pt started on vitamin B12 500 mcg daily.   GERD. Patient started on Protonix 40 mg daily.  Dry skin.  Apply " ammonium lactate twice daily.  Overgrown toenails.  Podiatry consulted.  Constipation.  Now with diarrhea.  Hold Senokot-S and MiraLAX daily.  Given Lomotil x1 on 3/10.  Malnutrition.  Nutrition following.  Adult Malnutrition type: Chronic illness.  Adult Degree of Malnutrition: Malnutrition of moderate degree.  Malnutrition Characteristics: Inadequate energy, Weight loss.  Dental infection.  Pt started on clindamycin q8h x7 days along with Florastor (thru 3/21).  Will also add chlorhexidine swish&spit q12h.  Magic mouthwash as needed.  Pt encouraged to follow-up with dentist once discharged.

## 2025-03-17 NOTE — NURSING NOTE
Pt was visible on the unit and social with peers. Pleasant and cooperative with staff during care. Denied SI, HI, AVH, depression, or anxiety. Affect was flat. Eye contact was good. Speech pattern was normal and relaxed. Made no c/o pain or discomfort. Safety checks continued.

## 2025-03-17 NOTE — DISCHARGE INSTR - OTHER ORDERS
You are being discharged to your home at [address] [place] [id number] TELEPHONE [phone]     Triggers you have identified during your hospitalization that led to your admission of a regressed mood due to ineffective coping skills. Coping skills you have identified during your hospitalization include exercise, baking, and gardening. If you are unable to deal with your distressed mood alone please contact your daughter Meeta [phone]. If that is not effective and you continue to have (ex: suicidal ideation, homicidal ideation, distressed mood, overwhelmed, in crisis) please contact Crisis by dialing [phone], call [facility] Crisis:  [phone], dial [phone] or go to the nearest emergency center.      *[facility] Crisis:  [phone]  *National Suicide Prevention Lifeline:  1-558.633.1532  *Alcohol Anonymous: 263.128.9457  *[facility] Drug & Alcohol Commission: 799.897.5771   *National [place] on Mental Illness (MARISOL) HELPLINE: 982.129.3725/Website: www.marisol.org  *Substance Abuse and Mental Health Services Administration([facility]) National Helpline, which is a confidential, free, 24-hour-a-day, 365-day-a-year, information service for individuals and family members facing mental health and/or substance use disorders. This service provides referrals to local treatment facilities, support groups, and community-based organizations. Callers can also order free publications and other information.  Call 1-974.408.6366/Website: www.[facility].gov  *[facility] 2-1-1: This is a toll free, confidential, 24-hour-a-day service which connects you to a community  in your area who can help you find services and resources that are available to you locally and provide critical services that can improve and save lives.  Call: 211  /Website: http://www.[employer]/       Maria, or Rose Marie, our Behavioral Health Nurse Navigators, will be calling you after your discharge, on the phone number that you provided.  They will  be available as an additional support, if needed.   If you wish to speak with one of them, you may contact Maria at [phone] or Rose Marie at [phone].      [facility] is currently conducting drug and alcohol assessments and CRN evaluations in-person and by telephone. Telehealth is available to those who meet eligibility criteria.  Please stop in or call [phone] between 8:00 AM and 4:00 PM Monday through Friday.  The [facility] is open for urinalysis collection Monday through Friday 11:00 AM to 6:00 PM.     Food [place], Food Pantries & Food Assistance  If you are in need of food [facility] has started an emergency food pantry. This pantry will provide you and your family with a box of non-perishable food that can help until you are able to go to your local pantry for more. To take advantage of this pantry just come to our office located at [address] [place] [address] anytime between 8:00 am and 3:00 pm.  The [facility] [employer] has changed its name to [employer]. To find a pantry near you call them at [phone].    [facility]  ([employer] [facility])  [address] [place] [address]  Food Served: 11:30 am to 12:30 pm  Monday, Tuesday, Wednesday, Thursday, Friday  Food Distribution: 8:30 am to 10:00 am  Every Wednesday except any 5th Wednesday of the month      [facility]  [address] [place] [address]  Drop in Center: 6am - 10am  Breakfast: 7am - 8am  Monday through Friday

## 2025-03-17 NOTE — SOCIAL WORK
Sidra spoke with Meeta PT's daughter at [phone]. Meeta stated that PT sounds pleasant and more like herself over the phone and that she would be able to pick her up Tuesday however she has an appointment at 1:40pm so she wanted to check with PT to confirm that it is okay for her to  PT after her appointment . Sidra stated that she will provide time of 3pm to staff and if any thing changes Meeta can let Case management team know Meeta agreed .  Communication ended mutually .

## 2025-03-17 NOTE — NURSING NOTE
Patient is visible in milieu for meals and group. Patient denies SI/HI/AVH. Patient was upset about not discharging today but is understanding. Pleasant upon interaction. Patient is compliant with medications. No acute behaviors noted. Q 15 min safety checks maintained.

## 2025-03-17 NOTE — SOCIAL WORK
03/17/25    Team Meeting   Meeting Type Daily Rounds   Team Members Present   Team Members Present Physician;Nurse;;Occupational Therapist   Physician Team Member Ruby METCALF, Sri Cornelius MD,   Nursing Team Member Michelle RN   Social Work Team Member Ezio CALVIN   OT Team Member Gino HAND   Patient/Family Present   Patient Present No   Patient's Family Present No   201, Slept , Denies SI/HI/AVH anxiety/depression. D/C Tuesday 3/18/25.

## 2025-03-18 VITALS
RESPIRATION RATE: 18 BRPM | TEMPERATURE: 97.6 F | SYSTOLIC BLOOD PRESSURE: 111 MMHG | DIASTOLIC BLOOD PRESSURE: 56 MMHG | HEART RATE: 86 BPM | OXYGEN SATURATION: 94 % | BODY MASS INDEX: 29.48 KG/M2 | HEIGHT: 63 IN | WEIGHT: 166.4 LBS

## 2025-03-18 PROBLEM — F31.64 BIPOLAR DISORDER, CURR EPISODE MIXED, SEVERE, WITH PSYCHOTIC FEATURES (HCC): Status: ACTIVE | Noted: 2025-03-18

## 2025-03-18 PROBLEM — F31.5 BIPOLAR AFFECTIVE DISORDER, DEPRESSED, SEVERE, WITH PSYCHOTIC BEHAVIOR (HCC): Status: RESOLVED | Noted: 2025-03-03 | Resolved: 2025-03-18

## 2025-03-18 PROCEDURE — 99238 HOSP IP/OBS DSCHRG MGMT 30/<: CPT | Performed by: PSYCHIATRY & NEUROLOGY

## 2025-03-18 RX ADMIN — DEXTRAN 70, GLYCERIN, HYPROMELLOSE 1 DROP: 1; 2; 3 SOLUTION/ DROPS OPHTHALMIC at 09:07

## 2025-03-18 RX ADMIN — ARIPIPRAZOLE 10 MG: 10 TABLET ORAL at 09:05

## 2025-03-18 RX ADMIN — CHOLECALCIFEROL TAB 25 MCG (1000 UNIT) 2000 UNITS: 25 TAB at 09:04

## 2025-03-18 RX ADMIN — CHLORHEXIDINE GLUCONATE 15 ML: 1.2 SOLUTION ORAL at 09:05

## 2025-03-18 RX ADMIN — PANTOPRAZOLE SODIUM 40 MG: 40 TABLET, DELAYED RELEASE ORAL at 06:38

## 2025-03-18 RX ADMIN — FOLIC ACID 1 MG: 1 TABLET ORAL at 09:05

## 2025-03-18 RX ADMIN — THIAMINE HCL TAB 100 MG 100 MG: 100 TAB at 09:05

## 2025-03-18 RX ADMIN — LAMOTRIGINE 50 MG: 25 TABLET ORAL at 09:05

## 2025-03-18 RX ADMIN — Medication 250 MG: at 06:38

## 2025-03-18 RX ADMIN — MONTELUKAST 10 MG: 10 TABLET, FILM COATED ORAL at 09:05

## 2025-03-18 RX ADMIN — CLINDAMYCIN HYDROCHLORIDE 300 MG: 150 CAPSULE ORAL at 06:38

## 2025-03-18 RX ADMIN — Medication 1 TABLET: at 09:05

## 2025-03-18 RX ADMIN — CLINDAMYCIN HYDROCHLORIDE 300 MG: 150 CAPSULE ORAL at 15:17

## 2025-03-18 RX ADMIN — ACETAMINOPHEN 975 MG: 325 TABLET ORAL at 04:22

## 2025-03-18 RX ADMIN — CYANOCOBALAMIN TAB 500 MCG 500 MCG: 500 TAB at 09:05

## 2025-03-18 RX ADMIN — Medication 250 MG: at 15:17

## 2025-03-18 NOTE — NURSING NOTE
Pt was visible on the unit, social with peers. Compliant with  medication. Pleasant and cooperative with staff during care. Denied SI, HI, AVH, depression, and anxiety. Pt's affect was appropriate to circumstance. Eye contact was good. Speech pattern was normal and relaxed. Made no c/o pain or discomfort. Appearance and hygiene was unremarkable. Safety checks continued.

## 2025-03-18 NOTE — TREATMENT TEAM
03/18/25 0422   Pain Assessment   Pain Score 8   Pain Location/Orientation Location: Back   Pain Radiating Towards no   Pain Onset/Description Onset: Ongoing   Effect of Pain on Daily Activities mood   Patient's Stated Pain Goal No pain   Hospital Pain Intervention(s) Medication (See MAR)   Multiple Pain Sites No   POSS Assessment   [place]ero Opioid-Induced Sedation Scale (POSS) 1     Acetaminophen 975 mg administered as ordered per pt's request. Will continue to monitor. Safety checks continued.

## 2025-03-18 NOTE — TREATMENT TEAM
03/18/25 0522   Pain Assessment Post Intervention   Reason Not Indicated Sleeping / Easy to arouse   Pain Assessment Tool Used: 0-10   Post Intervention Pain Score No Pain   Post Intervention Pain Location/Orientation Location: Generalized   Post Intervention POSS S   Response to Interventions effective     975 mg Acetaminophen, Pt observed resting in bed. No sign of pain or discomfort. Breathing calm and even. No SOB or labored breathing. Will continue to monitor. Safety checks continued.

## 2025-03-18 NOTE — SOCIAL WORK
03/18/25   Team Meeting   Meeting Type Daily Rounds   Team Members Present   Team Members Present Physician;Nurse;;Occupational Therapist   Physician Team Member Ruby METCALF, Sri Cornelius MD,   Nursing Team Member Michelle RN   Social Work Team Member Ezio CALVIN   OT Team Member Gino HAND   Patient/Family Present   Patient Present No   Patient's Family Present No   201, Slept , Denies SI,HI,AVH upset that she can not be picked up until 3 pm . D/C today.

## 2025-03-18 NOTE — TREATMENT TEAM
03/17/25 2128   Pain Assessment   Pain Assessment Tool 0-10   Pain Score 8   Pain Location/Orientation Location: Generalized   Pain Radiating Towards no   Pain Onset/Description Onset: Ongoing   Effect of Pain on Daily Activities mood   Patient's Stated Pain Goal No pain   Hospital Pain Intervention(s) Medication (See MAR)   Multiple Pain Sites No   POSS Assessment   Pasero Opioid-Induced Sedation Scale (POSS) 1     975 mg Acetaminophen administered as ordered per pt's request. Will continue to monitor. Safety checks continued.

## 2025-03-18 NOTE — NURSING NOTE
Patient discharged from [facility] to her home this afternoon at 1541. She is positive and hopeful. AVS reviewed with patient prior to leaving. She was informed of all follow-up appointments and instructed where to  medications. She was provided time to ask questions and had none at this time. All belongings reconciled and returned to patient upon leaving.

## 2025-03-18 NOTE — TREATMENT TEAM
03/17/25 2220   Pain Assessment Post Intervention   Pain Assessment Tool Used: 0-10   Post Intervention Pain Score No Pain   Post Intervention Pain Location/Orientation Location: Generalized   Post Intervention POSS 1   Response to Interventions effective     Follow up 975 mg Acetaminophen, effective. Pt observed resting in bed. No sign of pain or discomfort. Breathing calm and even. No SOB or labored breathing. Will continue to monitor. Safety checks continued.

## 2025-03-18 NOTE — PLAN OF CARE
Problem: Alteration in Thoughts and Perception  Goal: Treatment Goal: Gain control of psychotic behaviors/thinking, reduce/eliminate presenting symptoms and demonstrate improved reality functioning upon discharge  Outcome: Progressing  Goal: Refrain from acting on delusional thinking/internal stimuli  Description: Interventions:  - Monitor patient closely, per order   - Utilize least restrictive measures   - Set reasonable limits, give positive feedback for acceptable   - Administer medications as ordered and monitor of potential side effects  Outcome: Progressing  Goal: Agree to be compliant with medication regime, as prescribed and report medication side effects  Description: Interventions:  - Offer appropriate PRN medication and supervise ingestion; conduct AIMS, as needed   Outcome: Progressing   See chart note

## 2025-03-18 NOTE — PLAN OF CARE
Problem: Alteration in Thoughts and Perception  Goal: Treatment Goal: Gain control of psychotic behaviors/thinking, reduce/eliminate presenting symptoms and demonstrate improved reality functioning upon discharge  Outcome: Completed  Goal: Refrain from acting on delusional thinking/internal stimuli  Description: Interventions:  - Monitor patient closely, per order   - Utilize least restrictive measures   - Set reasonable limits, give positive feedback for acceptable   - Administer medications as ordered and monitor of potential side effects  Outcome: Completed  Goal: Agree to be compliant with medication regime, as prescribed and report medication side effects  Description: Interventions:  - Offer appropriate PRN medication and supervise ingestion; conduct AIMS, as needed   Outcome: Completed  Goal: Recognize dysfunctional thoughts, communicate reality-based thoughts at the time of discharge  Description: Interventions:  - Provide medication and psycho-education to assist patient in compliance and developing insight into his/her illness   Outcome: Completed     All goals adequately met for discharge.
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
## 2025-03-18 NOTE — NURSING NOTE
Patient is pleasant and appropriate. She denies all psychiatric symptoms at this time and is looking forward to discharge today. She became slightly upset when she learned she would not be discharging until this afternoon instead of this morning but handled it appropriately. No complaints of pain. Compliant with medications. Able to make needs known.

## 2025-03-18 NOTE — PROGRESS NOTES
"Progress Note - Brisa Nick 59 y.o. female MRN: [id number]    Unit/Bed#: [facility] 205-01 Encounter: [id number]        Subjective:   Patient seen and examined at bedside after reviewing the chart and discussing the case with the caring staff.      Patient examined at bedside.  Patient denies any acute symptoms.     Patient is being discharged today, Tuesday, 3/18/2025.     Physical Exam   Vitals: Blood pressure 124/59, pulse 77, temperature 97.5 °F (36.4 °C), temperature source Temporal, resp. rate 16, height 5' 3\" (1.6 m), weight 75.5 kg (166 lb 6.4 oz), SpO2 97%.,Body mass index is 29.48 kg/m².  Constitutional: Patient in no acute distress.  HEENT: PERR, EOMI, MMM, left bottom molar with surrounding erythema and edema.  No palpable abscess.  No facial swelling.   Cardiovascular: Normal rate and regular rhythm.    Pulmonary/Chest: Effort normal and breath sounds normal.   Abdomen: Soft, + BS, NT.    Assessment/Plan:  Brisa Nick is a(n) 59 y.o. year old female with bipolar affective disorder.    Medical clearance.  Patient is medically cleared for discharge.  All scripts will be sent out for the patient.     Asthma.  Patient is on Singulair 10 mg daily.  Albuterol inhaler as needed.  Chronic pain syndrome.  Involving lower back and bilateral hips.  Patient follows with pain management on outpt basis and on Butrans patch weekly.  Tylenol as needed.  Nicotine dependence.  NRT.  Alcohol abuse.  Patient started thiamine, folic acid and multivitamin.  Insomnia.  Patient is on melatonin 3 mg at bedtime.  Vitamin D deficiency.  Continue vitamin D supplement.  Dry eyes.  Patient is on artificial tears twice daily.  Vitamin B12 deficiency.  Pt started on vitamin B12 500 mcg daily.   GERD. Patient started on Protonix 40 mg daily.  Dry skin.  Apply ammonium lactate twice daily.  Overgrown toenails.  Podiatry consulted.  Constipation.  Now with diarrhea.  Hold Senokot-S and MiraLAX daily.  Given Lomotil x1 on " 3/10.  Malnutrition.  Nutrition following.  Adult Malnutrition type: Chronic illness.  Adult Degree of Malnutrition: Malnutrition of moderate degree.  Malnutrition Characteristics: Inadequate energy, Weight loss.  Dental infection.  Pt started on clindamycin q8h x7 days along with Florastor (thru 3/21).  Will also add chlorhexidine swish&spit q12h.  Magic mouthwash as needed.  Pt encouraged to follow-up with dentist once discharged.     The patient was discussed with Dr. Dailey and he is in agreement with the above note.

## 2025-03-18 NOTE — BH TRANSITION RECORD
Contact Information: If you have any questions, concerns, pended studies, tests and/or procedures, or emergencies regarding your inpatient behavioral health visit. Please contact [facility] at   and ask to speak to a , nurse or physician. A contact is available 24 hours/ 7 days a week at this number.     Summary of Procedures Performed During your Stay:  Below is a list of major procedures performed during your hospital stay and a summary of results:  - No major procedures performed.    Pending Studies (From admission, onward)      None          Please follow up on the above pending studies with your PCP and/or referring provider.

## 2025-05-01 ENCOUNTER — TELEPHONE (OUTPATIENT)
Dept: INTERNAL MEDICINE CLINIC | Facility: CLINIC | Age: 60
End: 2025-05-01

## 2025-08-12 ENCOUNTER — TELEPHONE (OUTPATIENT)
Age: 60
End: 2025-08-12